# Patient Record
Sex: MALE | Race: WHITE | NOT HISPANIC OR LATINO | Employment: OTHER | URBAN - METROPOLITAN AREA
[De-identification: names, ages, dates, MRNs, and addresses within clinical notes are randomized per-mention and may not be internally consistent; named-entity substitution may affect disease eponyms.]

---

## 2018-01-20 ENCOUNTER — HOSPITAL ENCOUNTER (INPATIENT)
Facility: CLINIC | Age: 73
LOS: 12 days | Discharge: HOME | DRG: 885 | End: 2018-02-01
Attending: EMERGENCY MEDICINE | Admitting: PSYCHIATRY & NEUROLOGY
Payer: COMMERCIAL

## 2018-01-20 DIAGNOSIS — F31.2 SEVERE MANIC BIPOLAR I DISORDER WITH PSYCHOTIC FEATURES (H): Primary | ICD-10-CM

## 2018-01-20 DIAGNOSIS — F30.10 MANIC BEHAVIOR (H): ICD-10-CM

## 2018-01-20 PROBLEM — F31.61 BIPOLAR 1 DISORDER, MIXED, MILD (H): Status: ACTIVE | Noted: 2018-01-20

## 2018-01-20 LAB
ALBUMIN UR-MCNC: 10 MG/DL
AMPHETAMINES UR QL SCN: NEGATIVE
ANION GAP SERPL CALCULATED.3IONS-SCNC: 6 MMOL/L (ref 3–14)
APPEARANCE UR: CLEAR
BARBITURATES UR QL: NEGATIVE
BASOPHILS # BLD AUTO: 0 10E9/L (ref 0–0.2)
BASOPHILS NFR BLD AUTO: 0.2 %
BENZODIAZ UR QL: NEGATIVE
BILIRUB UR QL STRIP: NEGATIVE
BUN SERPL-MCNC: 19 MG/DL (ref 7–30)
CALCIUM SERPL-MCNC: 8 MG/DL (ref 8.5–10.1)
CANNABINOIDS UR QL SCN: NEGATIVE
CHLORIDE SERPL-SCNC: 108 MMOL/L (ref 94–109)
CO2 SERPL-SCNC: 27 MMOL/L (ref 20–32)
COCAINE UR QL: NEGATIVE
COLOR UR AUTO: YELLOW
CREAT SERPL-MCNC: 1.09 MG/DL (ref 0.66–1.25)
DIFFERENTIAL METHOD BLD: ABNORMAL
EOSINOPHIL # BLD AUTO: 0.2 10E9/L (ref 0–0.7)
EOSINOPHIL NFR BLD AUTO: 2.1 %
ERYTHROCYTE [DISTWIDTH] IN BLOOD BY AUTOMATED COUNT: 15.2 % (ref 10–15)
GFR SERPL CREATININE-BSD FRML MDRD: 67 ML/MIN/1.7M2
GLUCOSE BLDC GLUCOMTR-MCNC: 124 MG/DL (ref 70–99)
GLUCOSE BLDC GLUCOMTR-MCNC: 127 MG/DL (ref 70–99)
GLUCOSE SERPL-MCNC: 144 MG/DL (ref 70–99)
GLUCOSE UR STRIP-MCNC: NEGATIVE MG/DL
HCT VFR BLD AUTO: 43.8 % (ref 40–53)
HGB BLD-MCNC: 14.6 G/DL (ref 13.3–17.7)
HGB UR QL STRIP: NEGATIVE
HYALINE CASTS #/AREA URNS LPF: 1 /LPF (ref 0–2)
IMM GRANULOCYTES # BLD: 0 10E9/L (ref 0–0.4)
IMM GRANULOCYTES NFR BLD: 0.2 %
KETONES UR STRIP-MCNC: NEGATIVE MG/DL
LEUKOCYTE ESTERASE UR QL STRIP: NEGATIVE
LYMPHOCYTES # BLD AUTO: 1.9 10E9/L (ref 0.8–5.3)
LYMPHOCYTES NFR BLD AUTO: 20.8 %
MCH RBC QN AUTO: 27.3 PG (ref 26.5–33)
MCHC RBC AUTO-ENTMCNC: 33.3 G/DL (ref 31.5–36.5)
MCV RBC AUTO: 82 FL (ref 78–100)
MONOCYTES # BLD AUTO: 0.6 10E9/L (ref 0–1.3)
MONOCYTES NFR BLD AUTO: 6.1 %
MUCOUS THREADS #/AREA URNS LPF: PRESENT /LPF
NEUTROPHILS # BLD AUTO: 6.5 10E9/L (ref 1.6–8.3)
NEUTROPHILS NFR BLD AUTO: 70.6 %
NITRATE UR QL: NEGATIVE
NRBC # BLD AUTO: 0 10*3/UL
NRBC BLD AUTO-RTO: 0 /100
OPIATES UR QL SCN: NEGATIVE
PCP UR QL SCN: NEGATIVE
PH UR STRIP: 5.5 PH (ref 5–7)
PLATELET # BLD AUTO: 177 10E9/L (ref 150–450)
POTASSIUM SERPL-SCNC: 4.3 MMOL/L (ref 3.4–5.3)
RBC # BLD AUTO: 5.35 10E12/L (ref 4.4–5.9)
RBC #/AREA URNS AUTO: 1 /HPF (ref 0–2)
SODIUM SERPL-SCNC: 141 MMOL/L (ref 133–144)
SOURCE: ABNORMAL
SP GR UR STRIP: 1.02 (ref 1–1.03)
TSH SERPL DL<=0.005 MIU/L-ACNC: 2.12 MU/L (ref 0.4–4)
UROBILINOGEN UR STRIP-MCNC: NORMAL MG/DL (ref 0–2)
WBC # BLD AUTO: 9.1 10E9/L (ref 4–11)
WBC #/AREA URNS AUTO: 1 /HPF (ref 0–2)

## 2018-01-20 PROCEDURE — 25000132 ZZH RX MED GY IP 250 OP 250 PS 637: Performed by: EMERGENCY MEDICINE

## 2018-01-20 PROCEDURE — 99285 EMERGENCY DEPT VISIT HI MDM: CPT | Mod: 25

## 2018-01-20 PROCEDURE — 80048 BASIC METABOLIC PNL TOTAL CA: CPT | Performed by: EMERGENCY MEDICINE

## 2018-01-20 PROCEDURE — 90791 PSYCH DIAGNOSTIC EVALUATION: CPT

## 2018-01-20 PROCEDURE — 87086 URINE CULTURE/COLONY COUNT: CPT | Performed by: EMERGENCY MEDICINE

## 2018-01-20 PROCEDURE — 00000146 ZZHCL STATISTIC GLUCOSE BY METER IP

## 2018-01-20 PROCEDURE — A9270 NON-COVERED ITEM OR SERVICE: HCPCS | Mod: GY | Performed by: PSYCHIATRY & NEUROLOGY

## 2018-01-20 PROCEDURE — 85025 COMPLETE CBC W/AUTO DIFF WBC: CPT | Performed by: EMERGENCY MEDICINE

## 2018-01-20 PROCEDURE — 80307 DRUG TEST PRSMV CHEM ANLYZR: CPT | Performed by: EMERGENCY MEDICINE

## 2018-01-20 PROCEDURE — 84443 ASSAY THYROID STIM HORMONE: CPT | Performed by: EMERGENCY MEDICINE

## 2018-01-20 PROCEDURE — 83036 HEMOGLOBIN GLYCOSYLATED A1C: CPT | Performed by: EMERGENCY MEDICINE

## 2018-01-20 PROCEDURE — 81001 URINALYSIS AUTO W/SCOPE: CPT | Performed by: EMERGENCY MEDICINE

## 2018-01-20 PROCEDURE — 12400006 ZZH R&B MH INTERMEDIATE

## 2018-01-20 PROCEDURE — 25000132 ZZH RX MED GY IP 250 OP 250 PS 637: Mod: GY | Performed by: PSYCHIATRY & NEUROLOGY

## 2018-01-20 RX ORDER — OLANZAPINE 5 MG/1
5-10 TABLET ORAL EVERY 6 HOURS PRN
Status: DISCONTINUED | OUTPATIENT
Start: 2018-01-20 | End: 2018-02-01 | Stop reason: HOSPADM

## 2018-01-20 RX ORDER — MULTIVITAMIN,THERAPEUTIC
1 TABLET ORAL DAILY
COMMUNITY

## 2018-01-20 RX ORDER — LISINOPRIL 5 MG/1
5 TABLET ORAL AT BEDTIME
Status: DISCONTINUED | OUTPATIENT
Start: 2018-01-20 | End: 2018-02-01 | Stop reason: HOSPADM

## 2018-01-20 RX ORDER — MULTIVITAMIN,THERAPEUTIC
1 TABLET ORAL DAILY
Status: DISCONTINUED | OUTPATIENT
Start: 2018-01-21 | End: 2018-02-01 | Stop reason: HOSPADM

## 2018-01-20 RX ORDER — DONEPEZIL HYDROCHLORIDE 5 MG/1
5 TABLET, FILM COATED ORAL AT BEDTIME
Status: DISCONTINUED | OUTPATIENT
Start: 2018-01-20 | End: 2018-01-23

## 2018-01-20 RX ORDER — METFORMIN HCL 500 MG
500 TABLET, EXTENDED RELEASE 24 HR ORAL
COMMUNITY

## 2018-01-20 RX ORDER — QUETIAPINE FUMARATE 25 MG/1
25-50 TABLET, FILM COATED ORAL 2 TIMES DAILY PRN
Status: DISCONTINUED | OUTPATIENT
Start: 2018-01-20 | End: 2018-02-01 | Stop reason: HOSPADM

## 2018-01-20 RX ORDER — METFORMIN HCL 500 MG
500 TABLET, EXTENDED RELEASE 24 HR ORAL
Status: DISCONTINUED | OUTPATIENT
Start: 2018-01-21 | End: 2018-02-01 | Stop reason: HOSPADM

## 2018-01-20 RX ORDER — LAMOTRIGINE 25 MG/1
25 TABLET ORAL DAILY
Status: DISCONTINUED | OUTPATIENT
Start: 2018-01-21 | End: 2018-01-30

## 2018-01-20 RX ORDER — TAMSULOSIN HYDROCHLORIDE 0.4 MG/1
0.4 CAPSULE ORAL AT BEDTIME
Status: DISCONTINUED | OUTPATIENT
Start: 2018-01-20 | End: 2018-02-01 | Stop reason: HOSPADM

## 2018-01-20 RX ORDER — OLANZAPINE 10 MG/1
10 TABLET, ORALLY DISINTEGRATING ORAL ONCE
Status: COMPLETED | OUTPATIENT
Start: 2018-01-20 | End: 2018-01-20

## 2018-01-20 RX ORDER — OLANZAPINE 10 MG/1
10 TABLET, ORALLY DISINTEGRATING ORAL AT BEDTIME
Status: DISCONTINUED | OUTPATIENT
Start: 2018-01-20 | End: 2018-01-20

## 2018-01-20 RX ORDER — TAMSULOSIN HYDROCHLORIDE 0.4 MG/1
0.4 CAPSULE ORAL AT BEDTIME
COMMUNITY

## 2018-01-20 RX ORDER — OMEGA-3-ACID ETHYL ESTERS 1 G/1
1.4 CAPSULE, LIQUID FILLED ORAL DAILY
COMMUNITY

## 2018-01-20 RX ORDER — QUETIAPINE FUMARATE 25 MG/1
25 TABLET, FILM COATED ORAL 2 TIMES DAILY
Status: DISCONTINUED | OUTPATIENT
Start: 2018-01-21 | End: 2018-02-01 | Stop reason: HOSPADM

## 2018-01-20 RX ORDER — HYDROXYZINE HYDROCHLORIDE 25 MG/1
25-50 TABLET, FILM COATED ORAL EVERY 4 HOURS PRN
Status: DISCONTINUED | OUTPATIENT
Start: 2018-01-20 | End: 2018-02-01 | Stop reason: HOSPADM

## 2018-01-20 RX ORDER — ASPIRIN 81 MG/1
81 TABLET, CHEWABLE ORAL AT BEDTIME
Status: DISCONTINUED | OUTPATIENT
Start: 2018-01-20 | End: 2018-02-01 | Stop reason: HOSPADM

## 2018-01-20 RX ORDER — ATORVASTATIN CALCIUM 10 MG/1
20 TABLET, FILM COATED ORAL AT BEDTIME
Status: DISCONTINUED | OUTPATIENT
Start: 2018-01-20 | End: 2018-02-01 | Stop reason: HOSPADM

## 2018-01-20 RX ORDER — OMEGA-3-ACID ETHYL ESTERS 1 G/1
1 CAPSULE, LIQUID FILLED ORAL DAILY
Status: DISCONTINUED | OUTPATIENT
Start: 2018-01-21 | End: 2018-02-01 | Stop reason: HOSPADM

## 2018-01-20 RX ADMIN — OLANZAPINE 10 MG: 10 TABLET, ORALLY DISINTEGRATING ORAL at 17:56

## 2018-01-20 RX ADMIN — LISINOPRIL 5 MG: 5 TABLET ORAL at 22:49

## 2018-01-20 RX ADMIN — ASPIRIN 81 MG 81 MG: 81 TABLET ORAL at 22:49

## 2018-01-20 RX ADMIN — TAMSULOSIN HYDROCHLORIDE 0.4 MG: 0.4 CAPSULE ORAL at 22:49

## 2018-01-20 RX ADMIN — ATORVASTATIN CALCIUM 20 MG: 10 TABLET, FILM COATED ORAL at 22:49

## 2018-01-20 RX ADMIN — CHOLECALCIFEROL TAB 25 MCG (1000 UNIT) 1000 UNITS: 25 TAB at 22:49

## 2018-01-20 ASSESSMENT — ACTIVITIES OF DAILY LIVING (ADL)
BATHING: 0 - INDEPENDENT
FALL_HISTORY_WITHIN_LAST_SIX_MONTHS: YES
RETIRED_COMMUNICATION: 0-->UNDERSTANDS/COMMUNICATES WITHOUT DIFFICULTY
SWALLOWING: 0 - SWALLOWS FOODS/LIQUIDS WITHOUT DIFFICULTY
DRESS: 0-->INDEPENDENT
CHANGE_IN_FUNCTIONAL_STATUS_SINCE_ONSET_OF_CURRENT_ILLNESS/INJURY: NO
TOILETING: 0 - INDEPENDENT
COGNITION: 0 - NO COGNITION ISSUES REPORTED
NUMBER_OF_TIMES_PATIENT_HAS_FALLEN_WITHIN_LAST_SIX_MONTHS: 1
LAUNDRY: WITH SUPERVISION
TRANSFERRING: 0-->INDEPENDENT
COMMUNICATION: 0 - UNDERSTANDS/COMMUNICATES WITHOUT DIFFICULTY
EATING: 0 - INDEPENDENT
AMBULATION: 0-->INDEPENDENT
ORAL_HYGIENE: INDEPENDENT
TRANSFERRING: 0 - INDEPENDENT
BATHING: 0-->INDEPENDENT
GROOMING: INDEPENDENT
SWALLOWING: 0-->SWALLOWS FOODS/LIQUIDS WITHOUT DIFFICULTY
RETIRED_EATING: 0-->INDEPENDENT
TOILETING: 0-->INDEPENDENT
AMBULATION: 0 - INDEPENDENT
DRESS: INDEPENDENT
DRESS: 0 - INDEPENDENT

## 2018-01-20 ASSESSMENT — ENCOUNTER SYMPTOMS
HALLUCINATIONS: 0
HYPERACTIVE: 1
NERVOUS/ANXIOUS: 1

## 2018-01-20 NOTE — IP AVS SNAPSHOT
[place] [address] [place] [address]    Phone:  [phone]                                       After Visit Summary   1/20/2018    Konstantin Morris    MRN: [id number]           After Visit Summary Signature Page     I have received my discharge instructions, and my questions have been answered. I have discussed any challenges I see with this plan with the nurse or doctor.    ..........................................................................................................................................  Patient/Patient Representative Signature      ..........................................................................................................................................  Patient Representative Print Name and Relationship to Patient    ..................................................               ................................................  Date                                            Time    ..........................................................................................................................................  Reviewed by Signature/Title    ...................................................              ..............................................  Date                                                            Time

## 2018-01-20 NOTE — IP AVS SNAPSHOT
MRN:[id number]                      After Visit Summary   1/20/2018    Konstantin Morris    MRN: [id number]           Thank you!     Thank you for choosing [facility] for your care. Our goal is always to provide you with excellent care.        Patient Information     Date Of Birth          1945        Designated Caregiver       Most Recent Value    Caregiver    Will someone help with your care after discharge? yes    Name of designated caregiver Hodan    Phone number of caregiver [phone]    Caregiver address [place], [place]      About your hospital stay     You were admitted on:  January 20, 2018 You last received care in the:  [facility]    You were discharged on:  February 1, 2018       Who to Call     For medical emergencies, please call 911.  For non-urgent questions about your medical care, please call your primary care provider or clinic, [phone]          Attending Provider     Provider Specialty    Mackenzie Rudd MD Emergency Medicine    [facility], Ricardo Vyas MD Psychiatry    Jairo Mayo MD Psychiatry       Primary Care Provider Office Phone # Fax #    [facility] [phone]      Further instructions from your care team       Behavioral Discharge Planning and Instructions    Summary: Admitted with I[facility]    Main Diagnosis: Bipolar Disorder Type I, most recent episode manic with psychotic features.    Lifestyle Adjustment: Please follow with psychiatrist and intensive outpatient program    Psychiatry Follow-up:     [place] [id number]  [place]  [phone]  Fax [phone]  Intake appointment for Intensive Outpatient [facility] with _Chriss Boo on Tuesday 2/6/18 @ 8:30 paperwork and interview @ 9 am._    [facility]  [address] [facility] [address]  [place]  [phone]  Fax [phone]  Appointment with Dr. Jairo Mayo MD on  "Friday, February 9 at 3:15 PM    Resources:   Crisis Intervention: 663.380.1375 or 394-254-7206 (TTY: 463.790.2709).  Call anytime for help.  National [place] on Mental Illness (www.[place].terra.org): 370.314.3360 or 221-936-5800.  Suicide Awareness Voices of Education (SAVE) (www.save.org): 823-935-ROKY (7072)  National Suicide Prevention Line (www.mentalhealthmn.org): 971-666-WMKE (5464)  Mental Health Consumer/Survivor Network of [place] (www.mhcsn.net): 209.130.7890 or 199-627-0500  Mental Health Association of [place] (www.mentalhealth.org): 973.400.8416 or 310-792-8117    General Medication Instructions:   See your medication sheet(s) for instructions.   Take all medicines as directed.  Make no changes unless your doctor suggests them.   Go to all your doctor visits.  Be sure to have all your required lab tests. This way, your medicines can be refilled on time.  Do not use any drugs not prescribed by your doctor.  Avoid alcohol.      Pending Results     No orders found from 1/18/2018 to 1/21/2018.            Statement of Approval     Ordered          02/01/18 4667  I have reviewed and agree with all the recommendations and orders detailed in this document.  EFFECTIVE NOW     Approved and electronically signed by:  Jairo Mayo MD             Admission Information     Date & Time Department Dept. Phone    1/20/2018 [facility] [phone]      Your Vitals Were     Blood Pressure Pulse Temperature Respirations Height Weight    122/78 56 98.4  F (36.9  C) (Oral) 15 1.753 m (5' 9\") 118.8 kg (261 lb 14.4 oz)    Pulse Oximetry BMI (Body Mass Index)                97% 38.68 kg/m2          [employer] Information     [employer] lets you send messages to your doctor, view your test results, renew your prescriptions, schedule appointments and more. To sign up, go to [email or web address] . Click on \"Log in\" on the left side of the screen, which will take you to the Welcome page. Then click on \"Sign up Now\" " on the right side of the page.     You will be asked to enter the access code listed below, as well as some personal information. Please follow the directions to create your username and password.     Your access code is: [email or web address]  Expires: 2018  1:15 PM     Your access code will  in 90 days. If you need help or a new code, please call your [facility] clinic or [phone].        Care EveryWhere ID     This is your Care EveryWhere ID. This could be used by other organizations to access your [facility] medical records  [id number]        Equal Access to Services     [facility]: Hadchayo Elizabeth, meka morris, pushpa quiroz, ruth mayorga . So [facility] [phone].    ATENCIÓN: Si habla español, tiene a pérez disposición servicios gratuitos de asistencia lingüística. Llame al [phone].    We comply with applicable federal civil rights laws and [place] laws. We do not discriminate on the basis of race, color, national origin, age, disability, sex, sexual orientation, or gender identity.               Review of your medicines      START taking        Dose / Directions    divalproex sodium extended-release 500 MG 24 hr tablet   Commonly known as:  DEPAKOTE ER   Used for:  Severe manic bipolar I disorder with psychotic features (H)        Dose:  1000 mg   Take 2 tablets (1,000 mg) by mouth 2 times daily   Quantity:  120 tablet   Refills:  0         CONTINUE these medicines which may have CHANGED, or have new prescriptions. If we are uncertain of the size of tablets/capsules you have at home, strength may be listed as something that might have changed.        Dose / Directions    * QUEtiapine 100 MG tablet   Commonly known as:  SEROquel   This may have changed:    - medication strength  - how much to take  - Another medication with the same name was removed. Continue taking this medication, and follow the directions you see here.   Used for:  Severe  manic bipolar I disorder with psychotic features (H)        Dose:  100 mg   Take 1 tablet (100 mg) by mouth At Bedtime   Quantity:  30 tablet   Refills:  0       * QUEtiapine 25 MG tablet   Commonly known as:  SEROquel   This may have changed:    - Another medication with the same name was changed. Make sure you understand how and when to take each.  - Another medication with the same name was removed. Continue taking this medication, and follow the directions you see here.   Used for:  Severe manic bipolar I disorder with psychotic features (H)        Dose:  25 mg   Take 1 tablet (25 mg) by mouth 2 times daily   Quantity:  60 tablet   Refills:  0       * Notice:  This list has 2 medication(s) that are the same as other medications prescribed for you. Read the directions carefully, and ask your doctor or other care provider to review them with you.      CONTINUE these medicines which have NOT CHANGED        Dose / Directions    ASPIRIN PO        Dose:  81 mg   Take 81 mg by mouth At Bedtime   Refills:  0       FLOMAX 0.4 MG capsule   Generic drug:  tamsulosin        Dose:  0.4 mg   Take 0.4 mg by mouth At Bedtime   Refills:  0       LIPITOR PO        Dose:  20 mg   Take 20 mg by mouth At Bedtime   Refills:  0       LISINOPRIL PO        Dose:  5 mg   Take 5 mg by mouth At Bedtime   Refills:  0       MAGNESIUM OXIDE PO        Dose:  250 mg   Take 250 mg by mouth daily   Refills:  0       metFORMIN 500 MG 24 hr tablet   Commonly known as:  GLUCOPHAGE-XR        Dose:  500 mg   Take 500 mg by mouth daily (with dinner)   Refills:  0       multivitamin, therapeutic Tabs tablet        Dose:  1 tablet   Take 1 tablet by mouth daily   Refills:  0       omega-3 acid ethyl esters 1 G capsule   Commonly known as:  Lovaza        Dose:  1.4 g   Take 1.4 g by mouth daily   Refills:  0       VITAMIN D (CHOLECALCIFEROL) PO        Dose:  1000 Units   Take 1,000 Units by mouth At Bedtime   Refills:  0         STOP taking     DONEPEZIL  HCL PO           LAMICTAL PO           ZOLOFT PO                Where to get your medicines      These medications were sent to [facility] [place], [place] - [address]  [address] [place] [address]     Phone:  [phone]     divalproex sodium extended-release 500 MG 24 hr tablet    QUEtiapine 100 MG tablet    QUEtiapine 25 MG tablet                Protect others around you: Learn how to safely use, store and throw away your medicines at www.disposemymeds.org.             Medication List: This is a list of all your medications and when to take them. Check marks below indicate your daily home schedule. Keep this list as a reference.      Medications           Morning Afternoon Evening Bedtime As Needed    ASPIRIN PO   Take 81 mg by mouth At Bedtime   Last time this was given:  81 mg on 1/31/2018  9:55 PM                                   divalproex sodium extended-release 500 MG 24 hr tablet   Commonly known as:  DEPAKOTE ER   Take 2 tablets (1,000 mg) by mouth 2 times daily   Last time this was given:  1,000 mg on 2/1/2018  8:13 AM                                      FLOMAX 0.4 MG capsule   Take 0.4 mg by mouth At Bedtime   Last time this was given:  0.4 mg on 1/31/2018  9:55 PM   Generic drug:  tamsulosin                                   LIPITOR PO   Take 20 mg by mouth At Bedtime   Last time this was given:  20 mg on 1/31/2018  9:55 PM                                   LISINOPRIL PO   Take 5 mg by mouth At Bedtime   Last time this was given:  5 mg on 1/31/2018  9:55 PM                                   MAGNESIUM OXIDE PO   Take 250 mg by mouth daily   Last time this was given:  200 mg on 2/1/2018  8:13 AM                                   metFORMIN 500 MG 24 hr tablet   Commonly known as:  GLUCOPHAGE-XR   Take 500 mg by mouth daily (with dinner)   Last time this was given:  500 mg on 1/31/2018  5:39 PM                    With Dinner.               multivitamin, therapeutic Tabs  tablet   Take 1 tablet by mouth daily   Last time this was given:  1 tablet on 2/1/2018  8:13 AM                                   omega-3 acid ethyl esters 1 G capsule   Commonly known as:  Lovaza   Take 1.4 g by mouth daily   Last time this was given:  1 g on 2/1/2018  8:13 AM                                   * QUEtiapine 100 MG tablet   Commonly known as:  SEROquel   Take 1 tablet (100 mg) by mouth At Bedtime   Last time this was given:  25 mg on 2/1/2018  1:58 PM                                   * QUEtiapine 25 MG tablet   Commonly known as:  SEROquel   Take 1 tablet (25 mg) by mouth 2 times daily   Last time this was given:  25 mg on 2/1/2018  1:58 PM            8 AM       2 PM                   VITAMIN D (CHOLECALCIFEROL) PO   Take 1,000 Units by mouth At Bedtime   Last time this was given:  1,000 Units on 1/31/2018  9:55 PM                                   * Notice:  This list has 2 medication(s) that are the same as other medications prescribed for you. Read the directions carefully, and ask your doctor or other care provider to review them with you.

## 2018-01-20 NOTE — ED NOTES
"Pt states that he had an episode of babbling around 10am but then drank a coke and has felt better ever since.  Pt states that he does in fact feel back to \"baseline\" but wife is shaking her head no in the background.   "

## 2018-01-20 NOTE — ED NOTES
Bed: ED16  Expected date: 1/20/18  Expected time:   Means of arrival:   Comments:  Hold for psych eval triage

## 2018-01-20 NOTE — ED PROVIDER NOTES
History     Chief Complaint:  Psychiatric Evaluation       HPI   Konstantin Morris is a 72 year old male who presents to the emergency department today for a psychiatric evaluation. The patient reports that he is in the emergency department for treatment of hypomania with associated difficulty sleeping and anxiety. He reports that he was put on Aricept by his primary care provider on 12/29 for memory issues. He was seen by his psychiatrist, Sonia Anaya, at [facility] on 1/11 at which time she diagnosed him bipolar disorder and placed him on Lamictal for hypomania. He is also followed by Dr. Guerra at [facility]. He reports that he had a concerning episode this morning that prompted his visit to the emergency department. He was previously hospitalized in 2013 for severe psychotic depression and has previously attempted to commit suicide 3 times. He reports that he has been having vivid dreams, which are unusual for him but denies any auditory or visual hallucinations while he is awake. The patient denies any suicidal or homicidal ideations.     Allergies:  No Known Drug Allergies     Medications:    Aricept  Lamictal     Past Medical History:    Diabetes Mellitus  Hypertension  Neuropathy  Obstructive sleep apnea   Bipolar disorder  Depressive disorder    Past Surgical History:    History reviewed. No pertinent surgical history.     Family History:    Bipolar disorder    Social History:  The patient was accompanied to the ED by his wife.  Smoking Status: Never Smoker  Smokeless Tobacco: Never Used  Alcohol Use: No   Marital Status:       Review of Systems   Psychiatric/Behavioral: Negative for hallucinations, self-injury and suicidal ideas. The patient is nervous/anxious and is hyperactive.    All other systems reviewed and are negative.    Physical Exam   Patient Vitals for the past 24 hrs:   BP Temp Temp src Pulse Resp SpO2 Height Weight   01/20/18 1744 - - - - - 97 % - -   01/20/18  "1736 - - - - - 97 % - -   01/20/18 1735 - - - - - 97 % - -   01/20/18 1725 154/81 - - - - 96 % - -   01/20/18 1316 120/69 98.2  F (36.8  C) Temporal 90 20 95 % 1.753 m (5' 9\") 119.7 kg (264 lb)      Physical Exam  General: Well-nourished, appears to be resting comfortably when I enter the room  Eyes: PERRL, conjunctivae pink no scleral icterus or conjunctival injection  ENT:  Moist mucus membranes, posterior oropharynx clear without erythema or exudates  Respiratory:  Lungs clear to auscultation bilaterally, no crackles/rubs/wheezes.  Good air movement  CV: Normal rate and rhythm, no murmurs/rubs/gallops  GI:  Abdomen soft and non-distended.  Normoactive BS.  No tenderness, guarding or rebound  Skin: Warm, dry.  No rashes or petechiae  Musculoskeletal: No peripheral edema or calf tenderness  Neuro: Alert and oriented to person/place/time  Psychiatric: Anxious affect, pressured and wandering speech.  Irritable with wife during interview.  No apparent hallucinations.  Denies SI/HI.    Emergency Department Course     Laboratory:  Laboratory findings were communicated with the patient who voiced understanding of the findings.  UA with Microscopic: clear yellow urine, protein albumin 10, mucous present o/w WNL  Drug abuse screen 77 urine: Negative    Urine Culture Aerobic Bacterial: Pending     CBC: AWNL. (WBC 9.1, HGB 14.6, )   BMP: Glucose 144 (H), Calcium 8.0 (L) o/w WNL (Creatinine 1.09)  Glucose by meter: 124 (H)    Interventions:  1756: Zyprexa 10mg ODT     Emergency Department Course:  Nursing notes and vitals reviewed.  The patient provided a urine sample here in the emergency department. This was sent for laboratory testing, findings above.   IV was inserted and blood was drawn for laboratory testing, results above.   1425: I performed an exam of the patient as documented above.   1436: I spoke with DEC regarding patient's presentation, findings, and plan of care.   1714: Patient had a panic attack and " required Zyprexa. Discussed with family who wanted admission.  1820: Patient rechecked and updated.    Findings and plan explained to the patient and family who consent to admission. The patient will be admitted to an [facility] bed for further monitoring, evaluation, and treatment.   I personally reviewed the laboratory results with the Patient and his wife and answered all related questions prior to admit.     Impression & Plan      Medical Decision Making:  Konstantin Morris is a pleasant 72 year old gentleman who has a history of psychosis from depression, recently diagnosed with bipolar disorder and had some medication changes who appears to be in manic a phase right now and significantly decompensated. He had a screaming panic attack here, which required us to physically hold him to prevent him from hurting himself until he was able to calm down. His blood sugar was normal and he was not seizing.  He apparently he was seeing spiders and became very anxious. He accepted readily some Zyprexa, which was given to him. DEC assessment had already been completed. In discussion with family, we think he does need admission. He calmed considerably and family was able to meet with him.  He wishes to be admitted to get help.  He will be admitted to [facility] psychiatry for further treatment.    Diagnosis:    ICD-10-CM    1. Manic behavior (H) F30.10      Disposition:   Admitted to [facility] bed  Scribe Disclosure:  I, Ani Dallas, am serving as a scribe at 2:35 PM on 1/20/2018 to document services personally performed by Mackenzie Rudd MD based on my observations and the provider's statements to me.    1/20/2018   EMERGENCY DEPARTMENT      Mackenzie Rudd MD  01/20/18 1950

## 2018-01-21 LAB
BACTERIA SPEC CULT: NO GROWTH
HBA1C MFR BLD: 6 % (ref 4.3–6)
Lab: NORMAL
SPECIMEN SOURCE: NORMAL

## 2018-01-21 PROCEDURE — 12400006 ZZH R&B MH INTERMEDIATE

## 2018-01-21 PROCEDURE — 25000132 ZZH RX MED GY IP 250 OP 250 PS 637: Mod: GY | Performed by: PSYCHIATRY & NEUROLOGY

## 2018-01-21 PROCEDURE — 94660 CPAP INITIATION&MGMT: CPT

## 2018-01-21 PROCEDURE — 99207 ZZC DOWN CODE DUE TO INITIAL EXAM: CPT | Performed by: INTERNAL MEDICINE

## 2018-01-21 PROCEDURE — 40000275 ZZH STATISTIC RCP TIME EA 10 MIN

## 2018-01-21 PROCEDURE — 99221 1ST HOSP IP/OBS SF/LOW 40: CPT | Performed by: INTERNAL MEDICINE

## 2018-01-21 PROCEDURE — 90853 GROUP PSYCHOTHERAPY: CPT

## 2018-01-21 PROCEDURE — A9270 NON-COVERED ITEM OR SERVICE: HCPCS | Mod: GY | Performed by: PSYCHIATRY & NEUROLOGY

## 2018-01-21 RX ORDER — DIVALPROEX SODIUM 500 MG/1
500 TABLET, EXTENDED RELEASE ORAL 2 TIMES DAILY
Status: DISCONTINUED | OUTPATIENT
Start: 2018-01-21 | End: 2018-01-23

## 2018-01-21 RX ORDER — POLYETHYLENE GLYCOL 3350 17 G/17G
17 POWDER, FOR SOLUTION ORAL DAILY
Status: DISCONTINUED | OUTPATIENT
Start: 2018-01-22 | End: 2018-02-01 | Stop reason: HOSPADM

## 2018-01-21 RX ADMIN — OMEGA-3-ACID ETHYL ESTERS 1 G: 1 CAPSULE, LIQUID FILLED ORAL at 09:40

## 2018-01-21 RX ADMIN — ASPIRIN 81 MG 81 MG: 81 TABLET ORAL at 21:35

## 2018-01-21 RX ADMIN — LAMOTRIGINE 25 MG: 25 TABLET ORAL at 09:40

## 2018-01-21 RX ADMIN — Medication 37.5 MG: at 00:08

## 2018-01-21 RX ADMIN — DONEPEZIL HYDROCHLORIDE 5 MG: 5 TABLET, FILM COATED ORAL at 00:09

## 2018-01-21 RX ADMIN — DONEPEZIL HYDROCHLORIDE 5 MG: 5 TABLET, FILM COATED ORAL at 21:35

## 2018-01-21 RX ADMIN — DIVALPROEX SODIUM 500 MG: 500 TABLET, EXTENDED RELEASE ORAL at 11:58

## 2018-01-21 RX ADMIN — ATORVASTATIN CALCIUM 20 MG: 10 TABLET, FILM COATED ORAL at 21:35

## 2018-01-21 RX ADMIN — QUETIAPINE 25 MG: 25 TABLET ORAL at 09:41

## 2018-01-21 RX ADMIN — QUETIAPINE 25 MG: 25 TABLET ORAL at 14:10

## 2018-01-21 RX ADMIN — SERTRALINE HYDROCHLORIDE 50 MG: 50 TABLET ORAL at 09:40

## 2018-01-21 RX ADMIN — LISINOPRIL 5 MG: 5 TABLET ORAL at 21:35

## 2018-01-21 RX ADMIN — METFORMIN HYDROCHLORIDE 500 MG: 500 TABLET, EXTENDED RELEASE ORAL at 18:16

## 2018-01-21 RX ADMIN — OLANZAPINE 10 MG: 5 TABLET, FILM COATED ORAL at 11:14

## 2018-01-21 RX ADMIN — Medication 200 MG: at 11:35

## 2018-01-21 RX ADMIN — TAMSULOSIN HYDROCHLORIDE 0.4 MG: 0.4 CAPSULE ORAL at 21:35

## 2018-01-21 RX ADMIN — DIVALPROEX SODIUM 500 MG: 500 TABLET, EXTENDED RELEASE ORAL at 21:35

## 2018-01-21 RX ADMIN — THERA TABS 1 TABLET: TAB at 09:40

## 2018-01-21 RX ADMIN — Medication 37.5 MG: at 21:35

## 2018-01-21 ASSESSMENT — ACTIVITIES OF DAILY LIVING (ADL)
DRESS: SCRUBS (BEHAVIORAL HEALTH)
ORAL_HYGIENE: INDEPENDENT
LAUNDRY: WITH SUPERVISION
GROOMING: INDEPENDENT
GROOMING: INDEPENDENT
DRESS: SCRUBS (BEHAVIORAL HEALTH)
ORAL_HYGIENE: INDEPENDENT
LAUNDRY: WITH SUPERVISION

## 2018-01-21 NOTE — PROGRESS NOTES
Writer called Respiratory regarding Pt's request for a C-Pap machine but the RT technician that Writer talked with named Billy stated that the hospital is out of C-Pap machines at the moment. Writer also called the nursing Supervisor, Lucía to inform her of such information. The latter promised to check on the request and get back to Writer. Eventually, a RT technician came up at about 0030 following which the patient who had told writer that he wanted to go to [facility] and had taken his HS medications was woken up for C-Pap set up. Pt is cooperative and denies any pain or discomfort at this time.

## 2018-01-21 NOTE — PLAN OF CARE
"Problem: Manic Symptoms  Goal: Manic Symptoms  Signs and symptoms of listed problems will be absent or manageable.   Outcome: No Change  Pt was sleeping at the start of the shift, he was calm and very social with staff this morning.  He was eating well and talking it up with his peers.  Late morning after using the bathroom he was seen walking back to his room slowly, staff approached and he had his eyes only half open.  As he got into bed he was not taking with staff.  The house keeper was changing the paper bags when Konstantin suddenly almost jumped up out of bed came out of his room yelling at her and slapped her cart.  Staff redirected him into his room and attempted to talk him down.  He was shaking and he speech was almost child like as he told a story about \"mother cracking eggs at 4:30 in the morning...\" he was repetitive and started to look increasingly on edge and agitated.  Staff offered to get oral PRN meds he was agreeable.  As staff was pulling the meds he was yelling out on the unit \"NO mom, NO NO, don't\"  When staff entered the room he was laying on his bed with eyes closed and would not respond to staff, he woke to some nailbed pressure and was yelling \"don't hurt me again\" with encouragement he took the offered PRN Zyprexa.  15 min later he exited the room calmly and was appologizing for his behaviors.  The rest of the day he has been calm and very social with his peers.  Family visited late in the shift and brought his cpap and some snacks.      "

## 2018-01-21 NOTE — PHARMACY-ADMISSION MEDICATION HISTORY
Admission medication history interview status for the 1/20/2018  admission is complete. See EPIC admission navigator for prior to admission medications     Medication history source reliability:Moderate    Actions taken by pharmacist (provider contacted, etc):None     Additional medication history information not noted on PTA med list :The patient has poor memory, not sure when he took the last dose     Medication reconciliation/reorder completed by provider prior to medication history? No    Time spent in this activity: 15 minutes    Prior to Admission medications    Medication Sig Last Dose Taking? Auth Provider   LamoTRIgine (LAMICTAL PO) Take 25 mg by mouth daily Added this med on 1/11/18, take one-half tablet once daily for one week, take one tablet once daily for one week, then titrate up to 100mg  Yes Unknown, Entered By History   QUEtiapine Fumarate (SEROQUEL PO) Take 12.5 mg by mouth 2 times daily   Yes Unknown, Entered By History   Sertraline HCl (ZOLOFT PO) Take 50 mg by mouth daily  Yes Unknown, Entered By History   QUEtiapine Fumarate (SEROQUEL PO) Take 37.5 mg by mouth At Bedtime  Yes Unknown, Entered By History

## 2018-01-21 NOTE — PROGRESS NOTES
RT got called from RN regarding pt requesting CPAP. RT explained that the hospital is short of CPAP/BiPAP machine and currently I can only place if the pt is in respiratory distress. RT went to mental health department and explained again the situation of CPAP/BiPAP shortage. I also mentioned that we only have two CPAP/BiPAP in the entire hospital for emergency situation. Later another RT got called from nursing supervisor and was told to put pt on CPAP. RT used the back up CPAP and placed the pt. Pt placed on CPAP +8 21% at 0110, when RT went to check for 0300 round, pt took off the CPAP and turned off the CPAP machine. RT will continue to follow.  1/21/2018  Anny Panchal

## 2018-01-21 NOTE — ED NOTES
Pt requesting we call his wife Hodan [phone] to bring his CPAP machine. Called with no answer. [address] called and spoke with Dorene-notified that pt will need a CPAP machine to sleep.

## 2018-01-21 NOTE — PROGRESS NOTES
01/20/18 2698   Patient Belongings   Did you bring any home meds/supplements to the hospital?  No   Disposition of Belongings Other (see comment)   Belongings Search Yes   Clothing Search Yes   Second Staff Ondina Pates  Shirt  n[employer] films jacket  3 gloves  Belt  3 pens  Wallet   - $23 cash   - misc. Business cards   - misc. Rewards cards  keychain w/ 9 keys  Notepad  Cell phone  Shoes (no laces)  [place]    Security Envelope #[id number]   - Visa [phone]   - SS card    Admission:  I am responsible for any personal items that are not sent to the safe or pharmacy. [facility] is not responsible for loss, theft or damage of any property in my possession.        Patient Signature: ___________________________________________      Date/Time:__________________________     Staff Signature: __________________________________      Date/Time:__________________________     [facility] Staff person, if patient is unable/unwilling to sign:      __________________________________________________________      Date/Time: __________________________        Discharge:  [facility] has returned all of my personal belongings:     Patient Signature: ________________________________________     Date/Time: ____________________________________     Staff Signature: ______________________________________     Date/Time:_____________________________________

## 2018-01-21 NOTE — H&P
[facility] Psychiatric H&P Note       Initial History     The patient's care was discussed with the treatment team and chart notes were reviewed.     Patient examined for psychiatric admission.     IDENTIFICATION    Patient is a 72 year old   male. Pt sees PCP [facility]. [facility], [place]. Pt sees a therapist at [facility]. He sees Sonia Anaya CNP for medication management.    HISTORY OF PRESENT ILLNESS    Mr. Konstantin Morris is a 72 year old male with a history of bipolar disorder and cognitive disorder. He states he has never exhibited manic symptoms prior to this recent episode. He states that he saw his medication provider recently and was given a diagnosis of bipolar disorder. He is a poor historian. 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 He claims he has won edjing for the work he had done for television. He reports he has interviewed a savant in the past. He states that he has worked as a  as well. He is quite grandiose in his presentation. He reports that he has met many famous people throughout his life. Pt has had a manic episode with elevated and expansive mood, a surplus of energy, grandiosity, decreased need for sleep, pressured speech, flight of ideas, increase in goal-oriented activity, increase in psychomotor agitation all of which were to the point of psychosocial impairment. Pt endorses that episode was not secondary to chemical use and lasted at least one week in time.  When informed that MD had to leave to see other patients, he urged MD to stay and listen to history. He continued to ramble and eventually interview had to be terminated. He then shut down and became withdrawn.    CHEMICAL DEPENDENCY HISTORY    None reported. Utox negative on admission.    PAST  PSYCHIATRIC HISTORY    One prior hospitalization in  the past for psychotic depression. Three prior suicide attempts.    FAMILY HISTORY    Sister and mother have a history of bipolar disordesr and hospitalization. Sister's son is an alcoholic, another son passed away from an overdose. His father used alcohol.    SOCIAL HISTORY    Pt was born and raised in [place], [place] as the youngest of two children. He graduated high school and attended school at [employer] in [place]. His father was alcoholic and verbally abusive. 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 He has six regional [employer] in recognition for his work. He has three children with his wife.     Medications     Prescriptions Prior to Admission   Medication Sig Dispense Refill Last Dose     LamoTRIgine (LAMICTAL PO) Take 25 mg by mouth daily Added this med on 1/11/18. Take 25mg for 2 weeks. Then take 50mg for 2 weeks. Then check in with NP, Sonia Anaya at [facility] in [place].   1/20/2018 at Unknown time     QUEtiapine Fumarate (SEROQUEL PO) Take 25 mg by mouth 2 times daily    1/20/2018 at Unknown time     Sertraline HCl (ZOLOFT PO) Take 50 mg by mouth daily   1/20/2018 at Unknown time     QUEtiapine Fumarate (SEROQUEL PO) Take 37.5 mg by mouth At Bedtime   1/19/2018 at Unknown time     QUEtiapine Fumarate (SEROQUEL PO) Take 25-50 mg by mouth 2 times daily as needed   1/20/2018 at Unknown time     ASPIRIN PO Take 81 mg by mouth At Bedtime   1/19/2018 at Unknown time     omega-3 acid ethyl esters (LOVAZA) 1 G capsule Take 1.4 g by mouth daily   1/20/2018 at Unknown time     multivitamin, therapeutic (THERA-VIT) TABS tablet Take 1 tablet by mouth daily   1/20/2018 at Unknown time     MAGNESIUM OXIDE PO Take 250 mg by mouth daily   1/20/2018 at Unknown time     VITAMIN D, CHOLECALCIFEROL, PO Take 1,000 Units by mouth At Bedtime   1/19/2018 at Unknown time     Atorvastatin Calcium (LIPITOR PO) Take 20 mg by mouth At Bedtime   1/19/2018 at Unknown  "time     tamsulosin (FLOMAX) 0.4 MG capsule Take 0.4 mg by mouth At Bedtime   1/19/2018 at Unknown time     LISINOPRIL PO Take 5 mg by mouth At Bedtime   1/19/2018 at Unknown time     DONEPEZIL HCL PO Take 5 mg by mouth At Bedtime   1/19/2018 at Unknown time     metFORMIN (GLUCOPHAGE-XR) 500 MG 24 hr tablet Take 500 mg by mouth daily (with dinner)   1/20/2018 at Unknown time       Scheduled Medications:    aspirin chewable tablet 81 mg  81 mg Oral At Bedtime     atorvastatin (LIPITOR) tablet 20 mg  20 mg Oral At Bedtime     donepezil (ARIcept) tablet 5 mg  5 mg Oral At Bedtime     lamoTRIgine (LaMICtal) tablet 25 mg  25 mg Oral Daily     lisinopril (PRINIVIL/ZESTRIL) tablet 5 mg  5 mg Oral At Bedtime     magnesium oxide  200 mg Oral Daily     metFORMIN  500 mg Oral Daily with supper     multivitamin, therapeutic  1 tablet Oral Daily     omega-3 acid ethyl esters  1 g Oral Daily     QUEtiapine (SEROquel) tablet 25 mg  25 mg Oral BID     QUEtiapine (SEROquel) half-tab 37.5 mg  37.5 mg Oral At Bedtime     sertraline (ZOLOFT) tablet 50 mg  50 mg Oral Daily     tamsulosin  0.4 mg Oral At Bedtime     cholecalciferol  1,000 Units Oral At Bedtime     PRNs:  hydrOXYzine, QUEtiapine (SEROquel) tablet 25-50 mg, OLANZapine      Allergies      No Known Allergies     Previous Medical History     Past Medical History:   Diagnosis Date     Depressive disorder      Diabetes mellitus (H)      Hypertension      Neuropathy      ROBIN (obstructive sleep apnea)         Medical Review of Systems     /88  Pulse 69  Temp 97.5  F (36.4  C) (Oral)  Resp 15  Ht 1.753 m (5' 9\")  Wt 117.5 kg (259 lb)  SpO2 97%  BMI 38.25 kg/m2  Body mass index is 38.25 kg/(m^2).    Previous 10-point ROS completed by Mackenzie Rudd MD on 1/20/18 reviewed by Jairo Mayo MD on January 21, 2018 and is unchanged except for those problems mentioned within the HPI.     Mental Status Examination     Appearance Sitting in chair, dressed in scrubs. Appears " stated age.   Attitude Cooperative   Orientation Oriented to person, place, time   Eye Contact Poor   Speech Hyperverbal, tangential   Language Normal   Psychomotor Behavior Agitated   Mood Euphoric, labile   Affect Heightened, dramatic   Thought Process Disorganized   Associations Intact   Thought Content Denies suicidal or homicidal ideation.    Fund of Knowledge Average   Insight Poor   Judgement Poor   Attention Span & Concentration Limited   Recent & Remote Memory Limited   Gait Normal   Muscle Tone Intact on visual inspection      Labs     Labs reviewed.  Recent Results (from the past 24 hour(s))   Glucose by meter    Collection Time: 01/20/18  2:08 PM   Result Value Ref Range    Glucose 124 (H) 70 - 99 mg/dL   Urine Culture    Collection Time: 01/20/18  2:44 PM   Result Value Ref Range    Specimen Description Midstream Urine     Special Requests Specimen received in preservative     Culture Micro PENDING    UA with Microscopic    Collection Time: 01/20/18  2:44 PM   Result Value Ref Range    Color Urine Yellow     Appearance Urine Clear     Glucose Urine Negative NEG^Negative mg/dL    Bilirubin Urine Negative NEG^Negative    Ketones Urine Negative NEG^Negative mg/dL    Specific Gravity Urine 1.024 1.003 - 1.035    Blood Urine Negative NEG^Negative    pH Urine 5.5 5.0 - 7.0 pH    Protein Albumin Urine 10 (A) NEG^Negative mg/dL    Urobilinogen mg/dL Normal 0.0 - 2.0 mg/dL    Nitrite Urine Negative NEG^Negative    Leukocyte Esterase Urine Negative NEG^Negative    Source Midstream Urine     WBC Urine 1 0 - 2 /HPF    RBC Urine 1 0 - 2 /HPF    Mucous Urine Present (A) NEG^Negative /LPF    Hyaline Casts 1 0 - 2 /LPF   Drug abuse screen urine    Collection Time: 01/20/18  2:44 PM   Result Value Ref Range    Amphetamine Qual Urine Negative NEG^Negative    Barbiturates Qual Urine Negative NEG^Negative    Benzodiazepine Qual Urine Negative NEG^Negative    Cannabinoids Qual Urine Negative NEG^Negative    Cocaine Qual  Urine Negative NEG^Negative    Opiates Qualitative Urine Negative NEG^Negative    PCP Qual Urine Negative NEG^Negative   CBC with platelets differential    Collection Time: 01/20/18  3:00 PM   Result Value Ref Range    WBC 9.1 4.0 - 11.0 10e9/L    RBC Count 5.35 4.4 - 5.9 10e12/L    Hemoglobin 14.6 13.3 - 17.7 g/dL    Hematocrit 43.8 40.0 - 53.0 %    MCV 82 78 - 100 fl    MCH 27.3 26.5 - 33.0 pg    MCHC 33.3 31.5 - 36.5 g/dL    RDW 15.2 (H) 10.0 - 15.0 %    Platelet Count 177 150 - 450 10e9/L    Diff Method Automated Method     % Neutrophils 70.6 %    % Lymphocytes 20.8 %    % Monocytes 6.1 %    % Eosinophils 2.1 %    % Basophils 0.2 %    % Immature Granulocytes 0.2 %    Nucleated RBCs 0 0 /100    Absolute Neutrophil 6.5 1.6 - 8.3 10e9/L    Absolute Lymphocytes 1.9 0.8 - 5.3 10e9/L    Absolute Monocytes 0.6 0.0 - 1.3 10e9/L    Absolute Eosinophils 0.2 0.0 - 0.7 10e9/L    Absolute Basophils 0.0 0.0 - 0.2 10e9/L    Abs Immature Granulocytes 0.0 0 - 0.4 10e9/L    Absolute Nucleated RBC 0.0    Basic metabolic panel    Collection Time: 01/20/18  3:00 PM   Result Value Ref Range    Sodium 141 133 - 144 mmol/L    Potassium 4.3 3.4 - 5.3 mmol/L    Chloride 108 94 - 109 mmol/L    Carbon Dioxide 27 20 - 32 mmol/L    Anion Gap 6 3 - 14 mmol/L    Glucose 144 (H) 70 - 99 mg/dL    Urea Nitrogen 19 7 - 30 mg/dL    Creatinine 1.09 0.66 - 1.25 mg/dL    GFR Estimate 67 >60 mL/min/1.7m2    GFR Estimate If Black 81 >60 mL/min/1.7m2    Calcium 8.0 (L) 8.5 - 10.1 mg/dL   TSH with free T4 reflex    Collection Time: 01/20/18  3:00 PM   Result Value Ref Range    TSH 2.12 0.40 - 4.00 mU/L   Glucose by meter    Collection Time: 01/20/18  5:17 PM   Result Value Ref Range    Glucose 127 (H) 70 - 99 mg/dL          Impression     This is a 72 year old male with a history of bipolar disorder, depression, and cognitive disorder who presents with manic behavior and agitation Discussed starting pt on Depakote.  Reviewed the side effects, benefits,  and complications of medication. Pt gave verbal agreement to begin Depakote 500mg bid. He will remain on the unit for stabilization.     Diagnoses     1. Bipolar Disorder Type I, most recent episode manic with psychotic features.     Plan     1. Explained side effects, benefits, and complications of medications to the patient, Pt gave verbal consent.  2. Medication changes: Begin Depakote 500mg bid.  3. Discussed treatment plan with patient and team.  4. Projected length of stay: Until pt has been stabilized with aftercare in place, 5+ days.  5. Dr. Wallace to assume care tomorrow.    Attestation:   Patient has been seen and evaluated by me, Jairo Mayo MD.    Patient ID:  Name: Konstantin Morris   MRN: [id number]  Admission: 1/20/2018   YOB: 1945

## 2018-01-21 NOTE — PROGRESS NOTES
Voluntary Pt arrived to [facility] ED with Hx bipolar & 1 previous hospitalization after a SA in 2013 by hanging. 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 Had another panic attack in ED today. Zoloft recently cut in half from 100mg to 50mg. Pt claims he has been sad for the past 3 years because co-workers have not contacted him to see how he is doing. Felt this reached a boiling point today. Non-insulin dependent diabetic, takes metformin. Does not check blood sugars. Uses a c-pap at night. No CD Hx. Father was an alcoholic & mother had bipolar illness and received ECT. 1 sister has bipolar & 1 sister has depression. Wife is supportive. Pt not currently suicidal and contracts for safety. New ID band placed on Pt. Denies allergies to medications.    Nursing assessment complete including patient and medication profiles. Risk assessments completed addressing suicide,fall,skin,nutrition and safety issues. Care plan initiated. Assessments reviewed with physician and admit orders received.     Welcome packet reviewed with patient. Information reviewed includes getting emergency help, preventing infections, understanding your care, using medication safely, reducing falls, preventing pressure ulcers, smoking cessation, powerful choices and Patients Bill of Rights. Pt. given tour of the unit and instruction on use of facility including emergency call light. Program schedule reviewed with patient. Questions regarding the unit addressed. Pt. Search completed and belongings inventoried.

## 2018-01-22 LAB — VALPROATE SERPL-MCNC: 26 MG/L (ref 50–100)

## 2018-01-22 PROCEDURE — 80164 ASSAY DIPROPYLACETIC ACD TOT: CPT | Performed by: PSYCHIATRY & NEUROLOGY

## 2018-01-22 PROCEDURE — 36415 COLL VENOUS BLD VENIPUNCTURE: CPT | Performed by: PSYCHIATRY & NEUROLOGY

## 2018-01-22 PROCEDURE — 25000132 ZZH RX MED GY IP 250 OP 250 PS 637: Mod: GY | Performed by: PSYCHIATRY & NEUROLOGY

## 2018-01-22 PROCEDURE — 12400000 ZZH R&B MH

## 2018-01-22 PROCEDURE — 99231 SBSQ HOSP IP/OBS SF/LOW 25: CPT | Performed by: INTERNAL MEDICINE

## 2018-01-22 PROCEDURE — A9270 NON-COVERED ITEM OR SERVICE: HCPCS | Mod: GY | Performed by: PSYCHIATRY & NEUROLOGY

## 2018-01-22 RX ADMIN — CHOLECALCIFEROL TAB 25 MCG (1000 UNIT) 1000 UNITS: 25 TAB at 21:41

## 2018-01-22 RX ADMIN — DONEPEZIL HYDROCHLORIDE 5 MG: 5 TABLET, FILM COATED ORAL at 21:40

## 2018-01-22 RX ADMIN — ATORVASTATIN CALCIUM 20 MG: 10 TABLET, FILM COATED ORAL at 21:41

## 2018-01-22 RX ADMIN — DIVALPROEX SODIUM 500 MG: 500 TABLET, EXTENDED RELEASE ORAL at 21:41

## 2018-01-22 RX ADMIN — QUETIAPINE 25 MG: 25 TABLET ORAL at 16:08

## 2018-01-22 RX ADMIN — OLANZAPINE 10 MG: 5 TABLET, FILM COATED ORAL at 08:49

## 2018-01-22 RX ADMIN — LISINOPRIL 5 MG: 5 TABLET ORAL at 21:41

## 2018-01-22 RX ADMIN — OMEGA-3-ACID ETHYL ESTERS 1 G: 1 CAPSULE, LIQUID FILLED ORAL at 08:54

## 2018-01-22 RX ADMIN — QUETIAPINE 25 MG: 25 TABLET ORAL at 08:54

## 2018-01-22 RX ADMIN — THERA TABS 1 TABLET: TAB at 08:54

## 2018-01-22 RX ADMIN — TAMSULOSIN HYDROCHLORIDE 0.4 MG: 0.4 CAPSULE ORAL at 21:41

## 2018-01-22 RX ADMIN — LAMOTRIGINE 25 MG: 25 TABLET ORAL at 08:54

## 2018-01-22 RX ADMIN — METFORMIN HYDROCHLORIDE 500 MG: 500 TABLET, EXTENDED RELEASE ORAL at 17:32

## 2018-01-22 RX ADMIN — ASPIRIN 81 MG 81 MG: 81 TABLET ORAL at 21:40

## 2018-01-22 RX ADMIN — Medication 37.5 MG: at 23:27

## 2018-01-22 RX ADMIN — DIVALPROEX SODIUM 500 MG: 500 TABLET, EXTENDED RELEASE ORAL at 08:54

## 2018-01-22 RX ADMIN — Medication 200 MG: at 08:54

## 2018-01-22 ASSESSMENT — ACTIVITIES OF DAILY LIVING (ADL)
DRESS: INDEPENDENT
DRESS: PROMPTS
GROOMING: INDEPENDENT
LAUNDRY: WITH SUPERVISION
ORAL_HYGIENE: INDEPENDENT
GROOMING: INDEPENDENT
LAUNDRY: WITH SUPERVISION
ORAL_HYGIENE: INDEPENDENT

## 2018-01-22 NOTE — PLAN OF CARE
Problem: Patient Care Overview  Goal: Individualization & Mutuality  Outcome: Improving  Grandiose at times. He had an episode this morning during breakfast, while he was picking up his breakfast, while he was shaking and nervous, he was helped to the chair and he continued shaking, did not appear to be a seizure, no muscle stiffness, rigidity or loss of consciousness.  He had his eyes closed but he was able to respond to us.  Gave him Olanzapine and all his medication and he did better. Patient is on depakote.   Needs redirection at times.

## 2018-01-22 NOTE — H&P
History and Physical     Konstantin Morris MRN# [id number]   YOB: 1945 Age: 72 year old      Date of Admission:  1/20/2018    Primary care provider: [facility], [place]          Assessment and Plan:     72 year old male, with PmHx of bipolar disease, Type 2 DM, ?diabetic neuropathy, depression,  previous suicide attempts, sleep apnea, was admitted on 1/20/18, due to concern of tequila.  Patient had several panic attacks on the day of admission. Work up done on 1/20/18 revealed,  Normal BMP and CBC. UA and Urine drug screen were negative.     1. Bipolar 1 disorder: for management per Psychiatrist. Continue depakote, lamotrigine and seroquel.    2. Type 2 Diabetes: HbA1C was 6% on 1/20/18. Continue metformin. Continue aspirin. Will not order insulin sliding scale prn or accuchecks, due to good glycemic control.    3. Dementia: continue Aricept.    4. BPH: continue flomax.    5. Hypertension: continue lisinopril.    6. Hyperlipidemia: continue lipitor.    Thank you for asking the hospitalist service to see the patient. We will see the patient  one more time and if patient is medical stable, we will sign off then.                   Chief Complaint:   For Initial Psychiatry H+P.     History is obtained from the patient         History of Present Illness:   72 year old male, with PmHx of bipolar disease, Type 2 DM, ?diabetic neuropathy, depression,  previous suicide attempts, sleep apnea, was admitted due to concern of tequila.  Patient had several panic attacks on the day of admission.  He reported having bad dreams recently and waking up  panicking.  He was at a [employer] function on the day of his admission,   when he had a breakdown post presentation. He started yelling. No complaints of chest pain or shortness of breath or palpitations. He reported having a cardiac stress test 2-3 years ago,   which was normal.He reported having occasional diarrhea and nocturia 5-6 times a night. Work up done on 1/20/18  revealed, normal BMP and CBC. UA and Urine drug screen were negative.                Past Medical History:     Past Medical History:   Diagnosis Date     Depressive disorder      Diabetes mellitus (H)      Hypertension      Neuropathy      ROBIN (obstructive sleep apnea)              Past Surgical History:   History reviewed. No pertinent surgical history.          Social History:     Social History   Substance Use Topics     Smoking status: Never Smoker     Smokeless tobacco: Never Used     Alcohol use No             Family History:   No family history on file.          Immunizations:     There is no immunization history on file for this patient.         Allergies:   No Known Allergies          Medications:     Prescriptions Prior to Admission   Medication Sig Dispense Refill Last Dose     LamoTRIgine (LAMICTAL PO) Take 25 mg by mouth daily Added this med on 1/11/18. Take 25mg for 2 weeks. Then take 50mg for 2 weeks. Then check in with NP, Sonia Anaya at [facility] in [place].   1/20/2018 at Unknown time     QUEtiapine Fumarate (SEROQUEL PO) Take 25 mg by mouth 2 times daily    1/20/2018 at Unknown time     Sertraline HCl (ZOLOFT PO) Take 50 mg by mouth daily   1/20/2018 at Unknown time     QUEtiapine Fumarate (SEROQUEL PO) Take 37.5 mg by mouth At Bedtime   1/19/2018 at Unknown time     QUEtiapine Fumarate (SEROQUEL PO) Take 25-50 mg by mouth 2 times daily as needed   1/20/2018 at Unknown time     ASPIRIN PO Take 81 mg by mouth At Bedtime   1/19/2018 at Unknown time     omega-3 acid ethyl esters (LOVAZA) 1 G capsule Take 1.4 g by mouth daily   1/20/2018 at Unknown time     multivitamin, therapeutic (THERA-VIT) TABS tablet Take 1 tablet by mouth daily   1/20/2018 at Unknown time     MAGNESIUM OXIDE PO Take 250 mg by mouth daily   1/20/2018 at Unknown time     VITAMIN D, CHOLECALCIFEROL, PO Take 1,000 Units by mouth At Bedtime   1/19/2018 at Unknown time     Atorvastatin Calcium (LIPITOR PO) Take 20 mg by  "mouth At Bedtime   1/19/2018 at Unknown time     tamsulosin (FLOMAX) 0.4 MG capsule Take 0.4 mg by mouth At Bedtime   1/19/2018 at Unknown time     LISINOPRIL PO Take 5 mg by mouth At Bedtime   1/19/2018 at Unknown time     DONEPEZIL HCL PO Take 5 mg by mouth At Bedtime   1/19/2018 at Unknown time     metFORMIN (GLUCOPHAGE-XR) 500 MG 24 hr tablet Take 500 mg by mouth daily (with dinner)   1/20/2018 at Unknown time             Review of Systems:   The 10 point Review of Systems is negative other than noted in the HPI           Physical Exam:     Vitals were reviewed  Patient Vitals for the past 24 hrs:   BP Temp Temp src Pulse Resp Height Weight   01/21/18 1614 100/47 98.2  F (36.8  C) Oral 82 16 - -   01/21/18 0900 137/88 97.5  F (36.4  C) Oral 69 15 - -   01/20/18 2127 138/86 97.6  F (36.4  C) Oral 67 16 1.753 m (5' 9\") 117.5 kg (259 lb)     Constitutional:   Elderly white male, awake, cooperative, no apparent distress, and appears stated age     Lungs:   Clear to auscultation bilaterally, no crackles or wheezing     Heart:   S1, S2 reg, rate and rhythm, no murmurs         Abdomen:   Obese,  non-tender, no palpable masses, BS present                  Data:     Results for orders placed or performed during the hospital encounter of 01/20/18   Glucose by meter   Result Value Ref Range    Glucose 124 (H) 70 - 99 mg/dL   UA with Microscopic   Result Value Ref Range    Color Urine Yellow     Appearance Urine Clear     Glucose Urine Negative NEG^Negative mg/dL    Bilirubin Urine Negative NEG^Negative    Ketones Urine Negative NEG^Negative mg/dL    Specific Gravity Urine 1.024 1.003 - 1.035    Blood Urine Negative NEG^Negative    pH Urine 5.5 5.0 - 7.0 pH    Protein Albumin Urine 10 (A) NEG^Negative mg/dL    Urobilinogen mg/dL Normal 0.0 - 2.0 mg/dL    Nitrite Urine Negative NEG^Negative    Leukocyte Esterase Urine Negative NEG^Negative    Source Midstream Urine     WBC Urine 1 0 - 2 /HPF    RBC Urine 1 0 - 2 /HPF    " Mucous Urine Present (A) NEG^Negative /LPF    Hyaline Casts 1 0 - 2 /LPF   Drug abuse screen urine   Result Value Ref Range    Amphetamine Qual Urine Negative NEG^Negative    Barbiturates Qual Urine Negative NEG^Negative    Benzodiazepine Qual Urine Negative NEG^Negative    Cannabinoids Qual Urine Negative NEG^Negative    Cocaine Qual Urine Negative NEG^Negative    Opiates Qualitative Urine Negative NEG^Negative    PCP Qual Urine Negative NEG^Negative   CBC with platelets differential   Result Value Ref Range    WBC 9.1 4.0 - 11.0 10e9/L    RBC Count 5.35 4.4 - 5.9 10e12/L    Hemoglobin 14.6 13.3 - 17.7 g/dL    Hematocrit 43.8 40.0 - 53.0 %    MCV 82 78 - 100 fl    MCH 27.3 26.5 - 33.0 pg    MCHC 33.3 31.5 - 36.5 g/dL    RDW 15.2 (H) 10.0 - 15.0 %    Platelet Count 177 150 - 450 10e9/L    Diff Method Automated Method     % Neutrophils 70.6 %    % Lymphocytes 20.8 %    % Monocytes 6.1 %    % Eosinophils 2.1 %    % Basophils 0.2 %    % Immature Granulocytes 0.2 %    Nucleated RBCs 0 0 /100    Absolute Neutrophil 6.5 1.6 - 8.3 10e9/L    Absolute Lymphocytes 1.9 0.8 - 5.3 10e9/L    Absolute Monocytes 0.6 0.0 - 1.3 10e9/L    Absolute Eosinophils 0.2 0.0 - 0.7 10e9/L    Absolute Basophils 0.0 0.0 - 0.2 10e9/L    Abs Immature Granulocytes 0.0 0 - 0.4 10e9/L    Absolute Nucleated RBC 0.0    Basic metabolic panel   Result Value Ref Range    Sodium 141 133 - 144 mmol/L    Potassium 4.3 3.4 - 5.3 mmol/L    Chloride 108 94 - 109 mmol/L    Carbon Dioxide 27 20 - 32 mmol/L    Anion Gap 6 3 - 14 mmol/L    Glucose 144 (H) 70 - 99 mg/dL    Urea Nitrogen 19 7 - 30 mg/dL    Creatinine 1.09 0.66 - 1.25 mg/dL    GFR Estimate 67 >60 mL/min/1.7m2    GFR Estimate If Black 81 >60 mL/min/1.7m2    Calcium 8.0 (L) 8.5 - 10.1 mg/dL   Glucose by meter   Result Value Ref Range    Glucose 127 (H) 70 - 99 mg/dL   TSH with free T4 reflex   Result Value Ref Range    TSH 2.12 0.40 - 4.00 mU/L   Hemoglobin A1c   Result Value Ref Range    Hemoglobin A1C  6.0 4.3 - 6.0 %   Valproic acid   Result Value Ref Range    Valproic Acid Level 26 (L) 50 - 100 mg/L   Urine Culture   Result Value Ref Range    Specimen Description Midstream Urine     Special Requests Specimen received in preservative     Culture Micro No growth

## 2018-01-22 NOTE — PLAN OF CARE
Problem: Patient Care Overview  Goal: Team Discussion  Team Plan:   Outcome: Improving  BEHAVIORAL TEAM DISCUSSION    Participants: Dr. Mayo, Case Management, Nursing staff, Psych associates   Progress: Patient continues to present as labile and grandiose at times.   Continued Stay Criteria/Rationale: Patient needs further stabilization.  Medical/Physical: n/a   Precautions:   Behavioral Orders   Procedures     Code 1 - Restrict to Unit     Routine Programming     As clinically indicated     Status 15     Every 15 minutes.     Plan: Patient needs further stabilization before discharge planning can be assessed.  Rationale for change in precautions or plan: patient improving slightly.

## 2018-01-22 NOTE — PLAN OF CARE
Problem: Manic Symptoms  Goal: Manic Symptoms  Signs and symptoms of listed problems will be absent or manageable.   Outcome: Improving  Visible,bright affect ,  appropiate and socially outgoing with others but not intrusive .Mood more stable.  Was pleasant, respectful with no emotional outbursts or irritabilty. Wife and two daughters visited and thing went well. He watched football on TV and was friendly. Was hyperverbal and speech was organized , logical. 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  Had some chafing of skin between legs . Provided baby powder and he says it helped.

## 2018-01-22 NOTE — PROGRESS NOTES
[facility] Psychiatric Progress Note       Interim History     The patient's care was discussed with the treatment team and chart notes were reviewed. Pt seen on ITC. Spoke with pt's wife yesterday. She reports that he has a history of psychotic depression and hospitalization in the past. He had an adverse reaction to antidepressants and was taken off of the medication due to increasing manic behavior. Pt had an episode of shaking while he was eating breakfast, claiming that it was a seizure. However he did not experience any loss of consciousness or muscle rigidity. He was able to speak with staff with his eyes closed. He was assisted with staff to return to his chair and continue eating breakfast. He continues to exhibit manic behavior but is redirectable.      Hospital Course     On 1/21/18, pt was admitted for manic behavior and agitation. He was started on Depakote 500mg bid. On 1/22/18, pt continued to exhibit tequila and lability. He will be continued on Depakote and provided Zyprexa and Seroquel PRN.     Medications     Current Facility-Administered Medications Ordered in Epic   Medication Dose Route Frequency Last Rate Last Dose     divalproex (DEPAKOTE ER) 24 hr tablet 500 mg  500 mg Oral BID   500 mg at 01/22/18 0854     polyethylene glycol (MIRALAX/GLYCOLAX) Packet 17 g  17 g Oral Daily         hydrOXYzine (ATARAX) tablet 25-50 mg  25-50 mg Oral Q4H PRN         aspirin chewable tablet 81 mg  81 mg Oral At Bedtime   81 mg at 01/21/18 2135     atorvastatin (LIPITOR) tablet 20 mg  20 mg Oral At Bedtime   20 mg at 01/21/18 2135     donepezil (ARIcept) tablet 5 mg  5 mg Oral At Bedtime   5 mg at 01/21/18 2135     lamoTRIgine (LaMICtal) tablet 25 mg  25 mg Oral Daily   25 mg at 01/22/18 0854     lisinopril (PRINIVIL/ZESTRIL) tablet 5 mg  5 mg Oral At Bedtime   5 mg at 01/21/18 2135     magnesium oxide (MAG-OX) half-tab 200 mg  200 mg Oral Daily   200 mg at 01/22/18 0854     metFORMIN  "(GLUCOPHAGE-XR) 24 hr tablet 500 mg  500 mg Oral Daily with supper   500 mg at 01/21/18 1816     multivitamin, therapeutic (THERA-VIT) tablet 1 tablet  1 tablet Oral Daily   1 tablet at 01/22/18 0854     omega-3 acid ethyl esters (Lovaza) capsule 1 g  1 g Oral Daily   1 g at 01/22/18 0854     QUEtiapine (SEROquel) tablet 25 mg  25 mg Oral BID   25 mg at 01/22/18 0854     QUEtiapine (SEROquel) half-tab 37.5 mg  37.5 mg Oral At Bedtime   37.5 mg at 01/21/18 2135     QUEtiapine (SEROquel) tablet 25-50 mg  25-50 mg Oral BID PRN         tamsulosin (FLOMAX) capsule 0.4 mg  0.4 mg Oral At Bedtime   0.4 mg at 01/21/18 2135     cholecalciferol (vitamin D3) tablet 1,000 Units  1,000 Units Oral At Bedtime   1,000 Units at 01/20/18 2249     OLANZapine (zyPREXA) tablet 5-10 mg  5-10 mg Oral Q6H PRN   10 mg at 01/22/18 0849     No current [facility]-ordered outpatient prescriptions on file.         Allergies      No Known Allergies     Medical Review of Systems     /64  Pulse 82  Temp 98.2  F (36.8  C) (Oral)  Resp 16  Ht 1.753 m (5' 9\")  Wt 117.5 kg (259 lb)  SpO2 97%  BMI 38.25 kg/m2  Body mass index is 38.25 kg/(m^2).  A 10-point review of systems was performed by Jairo Mayo MD and is negative, no new findings.      Psychiatric Examination     Appearance Sitting in chair, dressed in scrubs. Appears stated age.   Attitude Agitated   Orientation Oriented to person, place, time   Eye Contact Poor   Speech Hyperverbal, circumstantial   Language Normal   Psychomotor Behavior Agitated   Mood Grandiose, labile   Affect Heightened, dramatic   Thought Process Linear   Associations Intact   Thought Content Denies suicidal or homicidal ideation.    Fund of Knowledge Impaired   Insight Poor   Judgement Poor   Attention Span & Concentration Limited   Recent & Remote Memory Limited   Gait Normal    Muscle Tone Intact on visual inspection.        Labs     Labs reviewed.  Recent Results (from the past 24 hour(s))   Valproic " acid    Collection Time: 01/22/18  8:20 AM   Result Value Ref Range    Valproic Acid Level 26 (L) 50 - 100 mg/L        Impression      This is a 72 year old male with a history of bipolar disorder, depression, and cognitive disorder who presents with manic behavior and agitation. He will remain on the unit for stabilization.      Diagnoses      1. Bipolar Disorder Type I, most recent episode manic with psychotic features.      Plan      1. Explained side effects, benefits, and complications of medications to the patient, Pt gave verbal consent.  2. Medication changes: Continue current medications.  3. Discussed treatment plan with patient and team.  4. Projected length of stay: Until pt has been stabilized with aftercare in place, 5+ days.      Attestation:   Patient has been seen and evaluated by me, Jairo Mayo MD.    Patient ID:  Name: Konstantin Morris    MRN: [id number]  Admission: 1/20/2018   YOB: 1945

## 2018-01-23 LAB — VALPROATE SERPL-MCNC: 63 MG/L (ref 50–100)

## 2018-01-23 PROCEDURE — 80164 ASSAY DIPROPYLACETIC ACD TOT: CPT | Performed by: PSYCHIATRY & NEUROLOGY

## 2018-01-23 PROCEDURE — 97150 GROUP THERAPEUTIC PROCEDURES: CPT | Mod: GO

## 2018-01-23 PROCEDURE — 36415 COLL VENOUS BLD VENIPUNCTURE: CPT | Performed by: PSYCHIATRY & NEUROLOGY

## 2018-01-23 PROCEDURE — 25000132 ZZH RX MED GY IP 250 OP 250 PS 637: Performed by: PSYCHIATRY & NEUROLOGY

## 2018-01-23 PROCEDURE — 12400006 ZZH R&B MH INTERMEDIATE

## 2018-01-23 RX ADMIN — Medication 37.5 MG: at 21:41

## 2018-01-23 RX ADMIN — CHOLECALCIFEROL TAB 25 MCG (1000 UNIT) 1000 UNITS: 25 TAB at 21:42

## 2018-01-23 RX ADMIN — OMEGA-3-ACID ETHYL ESTERS 1 G: 1 CAPSULE, LIQUID FILLED ORAL at 08:56

## 2018-01-23 RX ADMIN — OLANZAPINE 10 MG: 5 TABLET, FILM COATED ORAL at 01:35

## 2018-01-23 RX ADMIN — THERA TABS 1 TABLET: TAB at 08:56

## 2018-01-23 RX ADMIN — METFORMIN HYDROCHLORIDE 500 MG: 500 TABLET, EXTENDED RELEASE ORAL at 17:32

## 2018-01-23 RX ADMIN — QUETIAPINE 25 MG: 25 TABLET ORAL at 14:10

## 2018-01-23 RX ADMIN — TAMSULOSIN HYDROCHLORIDE 0.4 MG: 0.4 CAPSULE ORAL at 21:42

## 2018-01-23 RX ADMIN — ATORVASTATIN CALCIUM 20 MG: 10 TABLET, FILM COATED ORAL at 21:41

## 2018-01-23 RX ADMIN — Medication 200 MG: at 08:56

## 2018-01-23 RX ADMIN — HYDROXYZINE HYDROCHLORIDE 50 MG: 25 TABLET ORAL at 03:59

## 2018-01-23 RX ADMIN — ASPIRIN 81 MG 81 MG: 81 TABLET ORAL at 21:42

## 2018-01-23 RX ADMIN — QUETIAPINE 50 MG: 25 TABLET ORAL at 03:59

## 2018-01-23 RX ADMIN — LISINOPRIL 5 MG: 5 TABLET ORAL at 21:41

## 2018-01-23 RX ADMIN — QUETIAPINE 25 MG: 25 TABLET ORAL at 08:56

## 2018-01-23 RX ADMIN — DIVALPROEX SODIUM 750 MG: 250 TABLET, FILM COATED, EXTENDED RELEASE ORAL at 08:58

## 2018-01-23 RX ADMIN — LAMOTRIGINE 25 MG: 25 TABLET ORAL at 08:56

## 2018-01-23 RX ADMIN — DIVALPROEX SODIUM 750 MG: 250 TABLET, FILM COATED, EXTENDED RELEASE ORAL at 21:42

## 2018-01-23 ASSESSMENT — ACTIVITIES OF DAILY LIVING (ADL)
ORAL_HYGIENE: INDEPENDENT
LAUNDRY: WITH SUPERVISION
GROOMING: INDEPENDENT
DRESS: STREET CLOTHES;INDEPENDENT

## 2018-01-23 NOTE — PLAN OF CARE
Problem: Manic Symptoms  Goal: Manic Symptoms  Signs and symptoms of listed problems will be absent or manageable.   Outcome: Improving  Pt was active and pleasant within the ITC and SDU. Pt presents with a full range affect and calm mood. Pt spent the day socializing with staff and peers in the lounge. Pt attended OT and participated well. Pt stated that he's feeling really good day and hopeful for the future. No complaints of pain today. Pt ate all of his meal and was med compliant.      *Pt's wife would like to talk to doctor about Aricept.*

## 2018-01-23 NOTE — PROGRESS NOTES
[facility] Psychiatric Progress Note       Interim History     The patient's care was discussed with the treatment team and chart notes were reviewed. Pt seen on [facility]. Pt reports he was unable to sleep last night, woke up around 0400. However, he appears less grandiose and manic this morning, less hyperverbal and able to hold a conversation. He is glad to be in the hospital for stabilization. He was apparently started on Aricept for dementia corresponding with the timing of his manic episode. VA level as of yesterday was 26. Will increase Depakote to 750mg bid and valproic acid draw STAT this morning. Aricept was discontinued.     Hospital Course     On 1/21/18, pt was admitted for manic behavior and agitation. He was started on Depakote 500mg bid. On 1/22/18, pt continued to exhibit tequila and lability. He will be continued on Depakote and provided Zyprexa and Seroquel PRN. On 1/23/18, pt was starting to become less manic and grandiose. Will increase Depakote to 750mg bid and valproic acid draw STAT. Aricept discontinued.     Medications     Current Facility-Administered Medications Ordered in Epic   Medication Dose Route Frequency Last Rate Last Dose     divalproex (DEPAKOTE ER) 24 hr tablet 750 mg  750 mg Oral BID         polyethylene glycol (MIRALAX/GLYCOLAX) Packet 17 g  17 g Oral Daily         hydrOXYzine (ATARAX) tablet 25-50 mg  25-50 mg Oral Q4H PRN   50 mg at 01/23/18 0359     aspirin chewable tablet 81 mg  81 mg Oral At Bedtime   81 mg at 01/22/18 2140     atorvastatin (LIPITOR) tablet 20 mg  20 mg Oral At Bedtime   20 mg at 01/22/18 2141     lamoTRIgine (LaMICtal) tablet 25 mg  25 mg Oral Daily   25 mg at 01/22/18 0854     lisinopril (PRINIVIL/ZESTRIL) tablet 5 mg  5 mg Oral At Bedtime   5 mg at 01/22/18 2141     magnesium oxide (MAG-OX) half-tab 200 mg  200 mg Oral Daily   200 mg at 01/22/18 0854     metFORMIN (GLUCOPHAGE-XR) 24 hr tablet 500 mg  500 mg Oral Daily with supper   500 mg at  "01/22/18 1732     multivitamin, therapeutic (THERA-VIT) tablet 1 tablet  1 tablet Oral Daily   1 tablet at 01/22/18 0854     omega-3 acid ethyl esters (Lovaza) capsule 1 g  1 g Oral Daily   1 g at 01/22/18 0854     QUEtiapine (SEROquel) tablet 25 mg  25 mg Oral BID   25 mg at 01/22/18 1608     QUEtiapine (SEROquel) half-tab 37.5 mg  37.5 mg Oral At Bedtime   37.5 mg at 01/22/18 2327     QUEtiapine (SEROquel) tablet 25-50 mg  25-50 mg Oral BID PRN   50 mg at 01/23/18 0359     tamsulosin (FLOMAX) capsule 0.4 mg  0.4 mg Oral At Bedtime   0.4 mg at 01/22/18 2141     cholecalciferol (vitamin D3) tablet 1,000 Units  1,000 Units Oral At Bedtime   1,000 Units at 01/22/18 2141     OLANZapine (zyPREXA) tablet 5-10 mg  5-10 mg Oral Q6H PRN   10 mg at 01/23/18 0135     No current [facility]-ordered outpatient prescriptions on file.         Allergies      No Known Allergies     Medical Review of Systems     /86  Pulse 80  Temp 98.3  F (36.8  C) (Oral)  Resp 17  Ht 1.753 m (5' 9\")  Wt 117.5 kg (259 lb)  SpO2 97%  BMI 38.25 kg/m2  Body mass index is 38.25 kg/(m^2).  A 10-point review of systems was performed by Jairo Mayo MD and is negative, no new findings.      Psychiatric Examination     Appearance Sitting in chair, dressed in scrubs. Appears stated age.   Attitude Agitated   Orientation Oriented to person, place, time   Eye Contact` Fair   Speech Improving, less hyperverbal and circumstantial   Language Normal   Psychomotor Behavior Less agitated   Mood Slightly less grandiose and manic   Affect Heightened, dramatic   Thought Process Linear   Associations Intact   Thought Content Denies suicidal or homicidal ideation.    Fund of Knowledge Improving   Insight Poor   Judgement Poor   Attention Span & Concentration Improving   Recent & Remote Memory Improving   Gait Normal    Muscle Tone Intact on visual inspection.        Labs     Labs reviewed.  Recent Results (from the past 24 hour(s))   Valproic acid    " Collection Time: 01/22/18  8:20 AM   Result Value Ref Range    Valproic Acid Level 26 (L) 50 - 100 mg/L        Impression      This is a 72 year old male with a history of bipolar disorder, depression, and cognitive disorder who presents with manic behavior and agitation. He will remain on the unit for stabilization.      Diagnoses      1. Bipolar Disorder Type I, most recent episode manic with psychotic features.      Plan      1. Explained side effects, benefits, and complications of medications to the patient, Pt gave verbal consent.  2. Medication changes: Discontinue Aricept. Increase Depakote to 750mg bid.  3. STAT VA level.  4. Discussed treatment plan with patient and team.  5. Projected length of stay: Until pt has been stabilized with aftercare in place, 5+ days.      Attestation:   Patient has been seen and evaluated by me, Jairo Mayo MD.    Patient ID:  Name: Konstantin Morris   MRN: [id number]  Admission: 1/20/2018   YOB: 1945

## 2018-01-23 NOTE — PLAN OF CARE
Problem: Manic Symptoms  Goal: Manic Symptoms  Signs and symptoms of listed problems will be absent or manageable.   Outcome: Improving  Patient presents with a more full range affect this shift. Patient was appropriate and insightful. He spent most of the shift bed resting. Patient stated that he didn't fall asleep last night till 4 am and he needed to catch up on some sleep. Patient did not appear labile or grandiose this shift. He was cooperative with medications and pleasant with staff and peers.

## 2018-01-23 NOTE — PLAN OF CARE
Problem: General Rehab Plan of Care  Goal: Occupational Therapy Goals  The patient and/or their representative will achieve their patient-specific goals related to the plan of care.  The patient-specific goals include:  INITIAL O.T. ASSESSMENT   Details:  Pt participated in OT Exercise group today with minimal encouragement. Affect was a bit tense. Pt engaged in group activity intermittently. He was distracted by questions and writing things down frequently. Attention was poor, 5-10 minutes. Pt engaged in about 50% of the group activity as a result. Pt appeared to write down a number of concepts and quotes from this writer, which was odd in the context of an exercise group in which one is expected to be moving, not taking notes. This behavior could be hypergraphia or could be indicative of memory issues secondary to poor attention.

## 2018-01-23 NOTE — PROGRESS NOTES
[facility]    Hospitalist Progress Note  Jake Combs MD    Assessment & Plan     72 year old male, with PmHx of bipolar disease, Type 2 DM, ?diabetic neuropathy, depression,  previous suicide attempts, sleep apnea, was admitted on 1/20/18, due to concern of tequila.  Patient had several panic attacks on the day of admission. Work up done on 1/20/18 revealed,  Normal BMP and CBC. UA and Urine drug screen were negative.      1. Bipolar 1 disorder: for management per Psychiatrist. Continue depakote, lamotrigine and seroquel scheduled and prn. For Zyprexa prn.      2. Type 2 Diabetes: HbA1C was 6% on 1/20/18. Continue metformin. Continue aspirin.   Will not order insulin sliding scale prn or accuchecks, due to good control.     3. Dementia: continue Aricept.     4. BPH: continue flomax.     5. Hypertension: continue lisinopril.     6. Hyperlipidemia: continue lipitor.     Thank you for asking the hospitalist service to see the patient. The patient is currently medically stable. We will sign off today and will be happy to see again, for any acute medical problems.        DVT Prophylaxis: Ambulate every shift  Code Status: Full Code    Disposition: Expected discharge, per Mental Health.      Interval History   The pt has no new complaints today.     -Data reviewed today: I reviewed all new labs and imaging results over the last 24 hours. I personally reviewed no images or EKG's today.    Physical Exam   Temp: 98.3  F (36.8  C) Temp src: Oral BP: 141/86 Pulse: 80   Resp: 17        Vitals:    01/20/18 1316 01/20/18 2127   Weight: 119.7 kg (264 lb) 117.5 kg (259 lb)     Vital Signs with Ranges  Temp:  [98.3  F (36.8  C)] 98.3  F (36.8  C)  Pulse:  [80] 80  Resp:  [17] 17  BP: (112-141)/(64-86) 141/86       Constitutional: Elderly white male, awake, cooperative, no apparent distress  Respiratory: BS vesicular bilaterally, no crackles or wheezing  Cardiovascular: S1 and S2, reg, no murmur  noted  GI: Soft, non-distended, non-tender, no masses, BS present+  Skin/Integumen: No rashes  Extremities: No pedal edema    Medications        divalproex  500 mg Oral BID     polyethylene glycol  17 g Oral Daily     aspirin chewable tablet 81 mg  81 mg Oral At Bedtime     atorvastatin (LIPITOR) tablet 20 mg  20 mg Oral At Bedtime     donepezil (ARIcept) tablet 5 mg  5 mg Oral At Bedtime     lamoTRIgine (LaMICtal) tablet 25 mg  25 mg Oral Daily     lisinopril (PRINIVIL/ZESTRIL) tablet 5 mg  5 mg Oral At Bedtime     magnesium oxide  200 mg Oral Daily     metFORMIN  500 mg Oral Daily with supper     multivitamin, therapeutic  1 tablet Oral Daily     omega-3 acid ethyl esters  1 g Oral Daily     QUEtiapine (SEROquel) tablet 25 mg  25 mg Oral BID     QUEtiapine (SEROquel) half-tab 37.5 mg  37.5 mg Oral At Bedtime     tamsulosin  0.4 mg Oral At Bedtime     cholecalciferol  1,000 Units Oral At Bedtime       Data     Recent Labs  Lab 01/20/18  1500   WBC 9.1   HGB 14.6   MCV 82         POTASSIUM 4.3   CHLORIDE 108   CO2 27   BUN 19   CR 1.09   ANIONGAP 6   MOOSE 8.0*   *       No results found for this or any previous visit (from the past 24 hour(s)).

## 2018-01-24 PROCEDURE — 97150 GROUP THERAPEUTIC PROCEDURES: CPT | Mod: GO

## 2018-01-24 PROCEDURE — 12400006 ZZH R&B MH INTERMEDIATE

## 2018-01-24 PROCEDURE — 25000132 ZZH RX MED GY IP 250 OP 250 PS 637: Performed by: PSYCHIATRY & NEUROLOGY

## 2018-01-24 PROCEDURE — 90853 GROUP PSYCHOTHERAPY: CPT

## 2018-01-24 RX ADMIN — QUETIAPINE 25 MG: 25 TABLET ORAL at 14:32

## 2018-01-24 RX ADMIN — CHOLECALCIFEROL TAB 25 MCG (1000 UNIT) 1000 UNITS: 25 TAB at 21:22

## 2018-01-24 RX ADMIN — LISINOPRIL 5 MG: 5 TABLET ORAL at 21:22

## 2018-01-24 RX ADMIN — DIVALPROEX SODIUM 750 MG: 250 TABLET, FILM COATED, EXTENDED RELEASE ORAL at 08:40

## 2018-01-24 RX ADMIN — QUETIAPINE 25 MG: 25 TABLET ORAL at 08:40

## 2018-01-24 RX ADMIN — DIVALPROEX SODIUM 750 MG: 250 TABLET, FILM COATED, EXTENDED RELEASE ORAL at 20:27

## 2018-01-24 RX ADMIN — METFORMIN HYDROCHLORIDE 500 MG: 500 TABLET, EXTENDED RELEASE ORAL at 17:41

## 2018-01-24 RX ADMIN — OLANZAPINE 10 MG: 5 TABLET, FILM COATED ORAL at 23:24

## 2018-01-24 RX ADMIN — TAMSULOSIN HYDROCHLORIDE 0.4 MG: 0.4 CAPSULE ORAL at 21:22

## 2018-01-24 RX ADMIN — Medication 200 MG: at 08:40

## 2018-01-24 RX ADMIN — Medication 37.5 MG: at 21:22

## 2018-01-24 RX ADMIN — ATORVASTATIN CALCIUM 20 MG: 10 TABLET, FILM COATED ORAL at 21:22

## 2018-01-24 RX ADMIN — OMEGA-3-ACID ETHYL ESTERS 1 G: 1 CAPSULE, LIQUID FILLED ORAL at 08:42

## 2018-01-24 RX ADMIN — ASPIRIN 81 MG 81 MG: 81 TABLET ORAL at 21:22

## 2018-01-24 RX ADMIN — THERA TABS 1 TABLET: TAB at 08:42

## 2018-01-24 RX ADMIN — LAMOTRIGINE 25 MG: 25 TABLET ORAL at 08:40

## 2018-01-24 RX ADMIN — HYDROXYZINE HYDROCHLORIDE 50 MG: 25 TABLET ORAL at 23:24

## 2018-01-24 NOTE — PLAN OF CARE
Problem: Patient Care Overview  Goal: Discharge Needs Assessment   attempted to meet with patient this afternoon to do a social history. Patient stated to  that he is having issues with memory and requested that  come back to do the social history later when his wife comes to visit.  will check in with him later to see if his wife does come to visit this afternoon.

## 2018-01-24 NOTE — PLAN OF CARE
Problem: Manic Symptoms  Goal: Manic Symptoms  Signs and symptoms of listed problems will be absent or manageable.   Outcome: Improving  Pt has been present and pleasant in the ITC throughout the day shift. Tried to attend a couple groups, but was unable due to personal complications. Pt is respectful to peers and staff and is bright upon approach and communication. Pt ate most of his meals for breakfast and lunch. Pt's gait became unsteady and slow during the morning after eating breakfast and was redirected to room to lay down for a bit. Pt presents a flat affect, but brightens easily. Pt requested headphones, but office did not have any batteries; pt was informed of this and later came up to window to request again.

## 2018-01-24 NOTE — PROGRESS NOTES
[facility] Psychiatric Progress Note       Interim History     The patient's care was discussed with the treatment team and chart notes were reviewed. Pt seen on ITC. Tolerating medications without side effects. Side effects, risks, and benefits of medications reviewed with patient. Pt reports he had some agitation yesterday. He was upset that staff had to utilize an extension cord to use his CPAP. He will be moved to SDU. He reports he is invited to the Super Bowl party with [employer]. He continues to exhibit some grandiosity and euphoria. VA level was 63 as of yesterday. Will check another level after several days on the increased dose of Depakote. Denies suicidal or homicidal ideation.      Hospital Course     On 1/21/18, pt was admitted for manic behavior and agitation. He was started on Depakote 500mg bid. On 1/22/18, pt continued to exhibit tequila and lability. He will be continued on Depakote and provided Zyprexa and Seroquel PRN. On 1/23/18, pt was starting to become less manic and grandiose. Will increase Depakote to 750mg bid and valproic acid draw STAT. Aricept discontinued. On 1/24/18, pt reported that he was frustrated he was unable to utilize his CPAP properly. He will be moved to SDU as he is more appropriate with the medications and able to participate in groups.      Medications     Current Facility-Administered Medications Ordered in Epic   Medication Dose Route Frequency Last Rate Last Dose     divalproex (DEPAKOTE ER) 24 hr tablet 750 mg  750 mg Oral BID   750 mg at 01/24/18 0840     polyethylene glycol (MIRALAX/GLYCOLAX) Packet 17 g  17 g Oral Daily         hydrOXYzine (ATARAX) tablet 25-50 mg  25-50 mg Oral Q4H PRN   50 mg at 01/23/18 0359     aspirin chewable tablet 81 mg  81 mg Oral At Bedtime   81 mg at 01/23/18 2142     atorvastatin (LIPITOR) tablet 20 mg  20 mg Oral At Bedtime   20 mg at 01/23/18 2141     lamoTRIgine (LaMICtal) tablet 25 mg  25 mg Oral Daily   25 mg at  "01/24/18 0840     lisinopril (PRINIVIL/ZESTRIL) tablet 5 mg  5 mg Oral At Bedtime   5 mg at 01/23/18 2141     magnesium oxide (MAG-OX) half-tab 200 mg  200 mg Oral Daily   200 mg at 01/24/18 0840     metFORMIN (GLUCOPHAGE-XR) 24 hr tablet 500 mg  500 mg Oral Daily with supper   500 mg at 01/23/18 1732     multivitamin, therapeutic (THERA-VIT) tablet 1 tablet  1 tablet Oral Daily   1 tablet at 01/24/18 0842     omega-3 acid ethyl esters (Lovaza) capsule 1 g  1 g Oral Daily   1 g at 01/24/18 0842     QUEtiapine (SEROquel) tablet 25 mg  25 mg Oral BID   25 mg at 01/24/18 0840     QUEtiapine (SEROquel) half-tab 37.5 mg  37.5 mg Oral At Bedtime   37.5 mg at 01/23/18 2141     QUEtiapine (SEROquel) tablet 25-50 mg  25-50 mg Oral BID PRN   50 mg at 01/23/18 0359     tamsulosin (FLOMAX) capsule 0.4 mg  0.4 mg Oral At Bedtime   0.4 mg at 01/23/18 2142     cholecalciferol (vitamin D3) tablet 1,000 Units  1,000 Units Oral At Bedtime   1,000 Units at 01/23/18 2142     OLANZapine (zyPREXA) tablet 5-10 mg  5-10 mg Oral Q6H PRN   10 mg at 01/23/18 0135     No current Epic-ordered outpatient prescriptions on file.         Allergies      No Known Allergies     Medical Review of Systems     /80  Pulse 77  Temp 97.4  F (36.3  C) (Oral)  Resp 18  Ht 1.753 m (5' 9\")  Wt 117.5 kg (259 lb)  SpO2 97%  BMI 38.25 kg/m2  Body mass index is 38.25 kg/(m^2).  A 10-point review of systems was performed by Jairo Mayo MD and is negative, no new findings.      Psychiatric Examination     Appearance Sitting in chair, dressed in scrubs. Appears stated age.   Attitude Cooperative, overly familiar   Orientation Oriented to person, place, time   Eye Contact` Fair   Speech Improving, less hyperverbal and circumstantial   Language Normal   Psychomotor Behavior Less agitated   Mood Remains somewhat grandiose and euphoric   Affect Heightened, dramatic   Thought Process Linear   Associations Intact   Thought Content Denies suicidal or " homicidal ideation.    Fund of Knowledge Improving   Insight Poor   Judgement Slightly improved   Attention Span & Concentration Improving   Recent & Remote Memory Improving   Gait Normal    Muscle Tone Intact on visual inspection.        Labs     Labs reviewed.  No results found for this or any previous visit (from the past 24 hour(s)).     Impression      This is a 72 year old male with a history of bipolar disorder, depression, and cognitive disorder who presents with manic behavior and agitation. He will remain on the unit for stabilization.      Diagnoses      1. Bipolar Disorder Type I, most recent episode manic with psychotic features.      Plan      1. Explained side effects, benefits, and complications of medications to the patient, Pt gave verbal consent.  2. Medication changes: Continue current medications.  3. Discussed treatment plan with patient and team.  4. Projected length of stay: Until pt has been stabilized with aftercare in place, 5+ days.  5. Pt to move to SDU.      Attestation:   Patient has been seen and evaluated by me, Jairo Mayo MD.    Patient ID:  Name: Konstantin Morris   MRN: [id number]  Admission: 1/20/2018   YOB: 1945

## 2018-01-24 NOTE — PLAN OF CARE
"Problem: Manic Symptoms  Goal: Manic Symptoms  Signs and symptoms of listed problems will be absent or manageable.   Outcome: No Change  Pt presented with a full-range affect. Was visible in the [facility] lounge. Spent most of the shift watching TV, socializing with peers, and visiting with family. He was pleasant and cooperative for most of the shift, though was labile later in the evening at bed time (see below). Pt hopes to be allowed to attend community meeting tomorrow.      At the end of the shift he requested to use his CPAP. After about an hour of using it, pt was irritable, and demanded that his CPAP be put away as he was concerned that the extension cord was a hazard to other patients. When staff began to gather his CPAP to take it out of his room, he became angry and yelled at the staff member for taking his CPAP away. A moment later he politely thanked staff and offered many compliments, but then became angry again and was threatening to call \"the authorities\" to notify them of all the hazards on the unit.   "

## 2018-01-25 PROCEDURE — 25000132 ZZH RX MED GY IP 250 OP 250 PS 637: Performed by: PSYCHIATRY & NEUROLOGY

## 2018-01-25 PROCEDURE — 25000132 ZZH RX MED GY IP 250 OP 250 PS 637: Performed by: INTERNAL MEDICINE

## 2018-01-25 PROCEDURE — 12400006 ZZH R&B MH INTERMEDIATE

## 2018-01-25 PROCEDURE — 90853 GROUP PSYCHOTHERAPY: CPT

## 2018-01-25 RX ORDER — DIVALPROEX SODIUM 500 MG/1
1000 TABLET, EXTENDED RELEASE ORAL 2 TIMES DAILY
Status: DISCONTINUED | OUTPATIENT
Start: 2018-01-25 | End: 2018-02-01 | Stop reason: HOSPADM

## 2018-01-25 RX ADMIN — TAMSULOSIN HYDROCHLORIDE 0.4 MG: 0.4 CAPSULE ORAL at 22:16

## 2018-01-25 RX ADMIN — Medication 37.5 MG: at 22:16

## 2018-01-25 RX ADMIN — QUETIAPINE 25 MG: 25 TABLET ORAL at 15:00

## 2018-01-25 RX ADMIN — Medication 200 MG: at 08:38

## 2018-01-25 RX ADMIN — THERA TABS 1 TABLET: TAB at 08:38

## 2018-01-25 RX ADMIN — QUETIAPINE 50 MG: 25 TABLET ORAL at 11:09

## 2018-01-25 RX ADMIN — DIVALPROEX SODIUM 750 MG: 250 TABLET, FILM COATED, EXTENDED RELEASE ORAL at 08:38

## 2018-01-25 RX ADMIN — LISINOPRIL 5 MG: 5 TABLET ORAL at 22:16

## 2018-01-25 RX ADMIN — CHOLECALCIFEROL TAB 25 MCG (1000 UNIT) 1000 UNITS: 25 TAB at 22:15

## 2018-01-25 RX ADMIN — OMEGA-3-ACID ETHYL ESTERS 1 G: 1 CAPSULE, LIQUID FILLED ORAL at 08:38

## 2018-01-25 RX ADMIN — POLYETHYLENE GLYCOL 3350 17 G: 17 POWDER, FOR SOLUTION ORAL at 08:35

## 2018-01-25 RX ADMIN — ASPIRIN 81 MG 81 MG: 81 TABLET ORAL at 22:16

## 2018-01-25 RX ADMIN — ATORVASTATIN CALCIUM 20 MG: 10 TABLET, FILM COATED ORAL at 22:16

## 2018-01-25 RX ADMIN — QUETIAPINE 25 MG: 25 TABLET ORAL at 08:38

## 2018-01-25 RX ADMIN — DIVALPROEX SODIUM 1000 MG: 500 TABLET, EXTENDED RELEASE ORAL at 22:15

## 2018-01-25 RX ADMIN — METFORMIN HYDROCHLORIDE 500 MG: 500 TABLET, EXTENDED RELEASE ORAL at 17:42

## 2018-01-25 RX ADMIN — LAMOTRIGINE 25 MG: 25 TABLET ORAL at 08:38

## 2018-01-25 ASSESSMENT — ACTIVITIES OF DAILY LIVING (ADL)
GROOMING: INDEPENDENT
ORAL_HYGIENE: INDEPENDENT
GROOMING: INDEPENDENT
LAUNDRY: WITH SUPERVISION
ORAL_HYGIENE: INDEPENDENT
LAUNDRY: WITH SUPERVISION
DRESS: STREET CLOTHES
DRESS: STREET CLOTHES

## 2018-01-25 NOTE — PLAN OF CARE
Problem: Patient Care Overview  Goal: Discharge Needs Assessment  Physician scheduling family meeting for next Monday,January 29, 2018 at 0900.  attempted to call wife, but there was no answer and the voicemail did not identify the number as belonging to her or her  so no message left.  will attempt to call again later.

## 2018-01-25 NOTE — PLAN OF CARE
Problem: Patient Care Overview  Goal: Discharge Needs Assessment  Patient attended Process Group today. He was noted to be quite tangential in his responses during group discussion. He was mildly intrusive with his peers but was redirectable.

## 2018-01-25 NOTE — PLAN OF CARE
Problem: Manic Symptoms  Goal: Manic Symptoms  Signs and symptoms of listed problems will be absent or manageable.   Outcome: No Change   Pt presents as confused. When he was done with group asked to go back to the [facility] despite having been moved over to the SDU. He required redirection and a tour of the SDU again. Patient displayed poor boundaries when speaking with staff and female peers, grabbed a peer's hand and made flattering comments to staff. Patient did not attend any groups. He visited with his wife and daughter this evening. He has rambling, tangential speech. During 1:1, patient talked about his sleep apnea and how he stopped breathing 46 times and as a result he has short term memory loss. While doing rounds, staff discovered patient had door barricaded with two chairs. When staff asked him about it he stated he was feeling scared that a male was going to enter his room and shoot bullets at him. He states he heard a voice of a male and became scared that he was going to get hurt so he barricaded the door. Patient felt better with reassurance from staff.

## 2018-01-25 NOTE — PROGRESS NOTES
"[facility] Psychiatric Progress Note       Interim History     The patient's care was discussed with the treatment team and chart notes were reviewed. Pt seen on ITC. Tolerating medications without side effects. Pt reports he is a \"9 out of 10 for most of the day, but I'm a 4 out 10 other parts of the day.\" He states that he was attempting to educate a peer about endorphins and this peer disclosed they attempting suicide. He reports that this triggered an adverse reaction where he shut down for several minutes. Pt was encouraged to practice proper boundaries, he continues to ramble tangentially and tends to invade personal space. Depakote will be increased to 1000mg bid. Recommended family meeting on Monday, 1/29/18.     Hospital Course     On 1/21/18, pt was admitted for manic behavior and agitation. He was started on Depakote 500mg bid. On 1/22/18, pt continued to exhibit tequila and lability. He will be continued on Depakote and provided Zyprexa and Seroquel PRN. On 1/23/18, pt was starting to become less manic and grandiose. Will increase Depakote to 750mg bid and valproic acid draw STAT. Aricept discontinued. On 1/24/18, pt reported that he was frustrated he was unable to utilize his CPAP properly. He will be moved to SDU as he is more appropriate with the medications and able to participate in groups. On 1/25/18, pt continued to have hypomanic behavior. He was encouraged to practice proper boundaries. Depakote will be increased to 1000mg bid. Planned for family meeting on 1/29/18.     Medications     Current Facility-Administered Medications Ordered in Epic   Medication Dose Route Frequency Last Rate Last Dose     divalproex (DEPAKOTE ER) 24 hr tablet 750 mg  750 mg Oral BID   750 mg at 01/25/18 0838     polyethylene glycol (MIRALAX/GLYCOLAX) Packet 17 g  17 g Oral Daily   17 g at 01/25/18 0835     hydrOXYzine (ATARAX) tablet 25-50 mg  25-50 mg Oral Q4H PRN   50 mg at 01/24/18 4908     aspirin " "chewable tablet 81 mg  81 mg Oral At Bedtime   81 mg at 01/24/18 2122     atorvastatin (LIPITOR) tablet 20 mg  20 mg Oral At Bedtime   20 mg at 01/24/18 2122     lamoTRIgine (LaMICtal) tablet 25 mg  25 mg Oral Daily   25 mg at 01/25/18 0838     lisinopril (PRINIVIL/ZESTRIL) tablet 5 mg  5 mg Oral At Bedtime   5 mg at 01/24/18 2122     magnesium oxide (MAG-OX) half-tab 200 mg  200 mg Oral Daily   200 mg at 01/25/18 0838     metFORMIN (GLUCOPHAGE-XR) 24 hr tablet 500 mg  500 mg Oral Daily with supper   500 mg at 01/24/18 1741     multivitamin, therapeutic (THERA-VIT) tablet 1 tablet  1 tablet Oral Daily   1 tablet at 01/25/18 0838     omega-3 acid ethyl esters (Lovaza) capsule 1 g  1 g Oral Daily   1 g at 01/25/18 0838     QUEtiapine (SEROquel) tablet 25 mg  25 mg Oral BID   25 mg at 01/25/18 0838     QUEtiapine (SEROquel) half-tab 37.5 mg  37.5 mg Oral At Bedtime   37.5 mg at 01/24/18 2122     QUEtiapine (SEROquel) tablet 25-50 mg  25-50 mg Oral BID PRN   50 mg at 01/23/18 0359     tamsulosin (FLOMAX) capsule 0.4 mg  0.4 mg Oral At Bedtime   0.4 mg at 01/24/18 2122     cholecalciferol (vitamin D3) tablet 1,000 Units  1,000 Units Oral At Bedtime   1,000 Units at 01/24/18 2122     OLANZapine (zyPREXA) tablet 5-10 mg  5-10 mg Oral Q6H PRN   10 mg at 01/24/18 2324     No current Epic-ordered outpatient prescriptions on file.         Allergies      No Known Allergies     Medical Review of Systems     /89  Pulse 61  Temp 97.4  F (36.3  C) (Oral)  Resp 15  Ht 1.753 m (5' 9\")  Wt 117.5 kg (259 lb)  SpO2 97%  BMI 38.25 kg/m2  Body mass index is 38.25 kg/(m^2).  A 10-point review of systems was performed by Jairo Mayo MD and is negative, no new findings.      Psychiatric Examination     Appearance Sitting in chair, dressed in scrubs. Appears stated age.   Attitude Cooperative, overly familiar   Orientation Oriented to person, place, time   Eye Contact Fair   Speech Improving, less hyperverbal and " circumstantial   Language Normal   Psychomotor Behavior Much less agitated   Mood Remains somewhat grandiose and euphoric   Affect Heightened, dramatic   Thought Process Linear   Associations Intact   Thought Content Denies suicidal or homicidal ideation.    Fund of Knowledge Average   Insight Slightly improved   Judgement Slightly improved   Attention Span & Concentration Improving   Recent & Remote Memory Improving   Gait Normal    Muscle Tone Intact on visual inspection.        Labs     Labs reviewed.  No results found for this or any previous visit (from the past 24 hour(s)).     Impression      This is a 72 year old male with a history of bipolar disorder, depression, and cognitive disorder who presents with manic behavior and agitation. He will remain on the unit for stabilization.      Diagnoses      1. Bipolar Disorder Type I, most recent episode manic with psychotic features.      Plan      1. Explained side effects, benefits, and complications of medications to the patient, Pt gave verbal consent.  2. Medication changes: Increase Depakote to 1000mg bid.  3. Discussed treatment plan with patient and team.  4. Projected length of stay: Until pt has been stabilized with aftercare in place, 5+ days.  5. Family meeting on 1/29/18 at 0900.      Attestation:   Patient has been seen and evaluated by me, Jairo Mayo MD.    Patient ID:  Name: Konstantin Morris   MRN: [id number]  Admission: 1/20/2018   YOB: 1945

## 2018-01-25 NOTE — PLAN OF CARE
Problem: Patient Care Overview  Goal: Team Discussion  Team Plan:   Outcome: No Change  BEHAVIORAL TEAM DISCUSSION    Participants: Dr. Mayo, Case Management, Nursing Staff, PA's  Progress: Same  Continued Stay Criteria/Rationale: Med compliance  Medical/Physical: Increase Depakote 1000 mg 2 times a day  Precautions:   Behavioral Orders   Procedures     Code 1 - Restrict to Unit     Routine Programming     As clinically indicated     Status 15     Every 15 minutes.     Plan: Increase Depakote 1000 mg 2x/day; enforce strict limits and boundaries  Rationale for change in precautions or plan: med compliance and boundaries

## 2018-01-25 NOTE — PLAN OF CARE
Problem: Manic Symptoms  Goal: Manic Symptoms  Signs and symptoms of listed problems will be absent or manageable.   Outcome: No Change  Pt was resting at the start of the shift, he was overly social and very active on the unit this morning.  Staff had to frequently remind him of healthy boundaries with some of the more impresonable patients.  During the focus group another patient was getting some extra attention when suddenly Konstantin started to; closes his eyes, shake his hands, sway in his chair, talking nonsensically, and then going quiet.  He responded to nailbed pressure and jerked his hand back from staff at this point he was escorted out of the lounge, when walking he was making himself heavy.  Once on bed staff walked out and in less then 5 minutes he walked out calmly and asked for some water.  He has been appropriate the rest of the shift.  Staff are continuing to enforce boundaries.  Patient denies active SI.

## 2018-01-25 NOTE — PLAN OF CARE
Problem: General Plan of Care (Inpatient Behavioral)  Goal: Discharge Planning  Patient attended Process Group on 1/24/18. Patient found it difficult to wait his turn, was reminded not to tell stories on other patients. He did share some anxieties and burst into uncontrollable sobbing. He was escorted back to his room and another staff person came in to check on on speak with him.

## 2018-01-25 NOTE — PROGRESS NOTES
"SPIRITUAL HEALTH SERVICES Progress Note  [facility]    Initial visit per pt request.  Pt largely told stories about his career, but included the family dynamics that he admits still influence his life today.  Pt displays some insight into shame and family dysfunction, and for this credits people he's known (e.g., Yosi English, Glen Hinojosa).  SH discussed, affirmed and encouraged pt to lean into this \"wisdom\" even as unit staff are developing an effective medication response for him.                                                                                                                                           Alonzo Shields M.Div., [facility]  Staff   Pager [phone]    "

## 2018-01-26 PROCEDURE — 25000132 ZZH RX MED GY IP 250 OP 250 PS 637: Performed by: INTERNAL MEDICINE

## 2018-01-26 PROCEDURE — 25000132 ZZH RX MED GY IP 250 OP 250 PS 637: Performed by: PSYCHIATRY & NEUROLOGY

## 2018-01-26 PROCEDURE — 90853 GROUP PSYCHOTHERAPY: CPT

## 2018-01-26 PROCEDURE — 12400006 ZZH R&B MH INTERMEDIATE

## 2018-01-26 RX ADMIN — QUETIAPINE 25 MG: 25 TABLET ORAL at 08:44

## 2018-01-26 RX ADMIN — Medication 200 MG: at 08:45

## 2018-01-26 RX ADMIN — POLYETHYLENE GLYCOL 3350 17 G: 17 POWDER, FOR SOLUTION ORAL at 08:45

## 2018-01-26 RX ADMIN — DIVALPROEX SODIUM 1000 MG: 500 TABLET, EXTENDED RELEASE ORAL at 20:37

## 2018-01-26 RX ADMIN — THERA TABS 1 TABLET: TAB at 08:44

## 2018-01-26 RX ADMIN — OMEGA-3-ACID ETHYL ESTERS 1 G: 1 CAPSULE, LIQUID FILLED ORAL at 08:44

## 2018-01-26 RX ADMIN — ASPIRIN 81 MG 81 MG: 81 TABLET ORAL at 22:02

## 2018-01-26 RX ADMIN — LAMOTRIGINE 25 MG: 25 TABLET ORAL at 08:45

## 2018-01-26 RX ADMIN — Medication 37.5 MG: at 22:02

## 2018-01-26 RX ADMIN — QUETIAPINE 25 MG: 25 TABLET ORAL at 15:20

## 2018-01-26 RX ADMIN — DIVALPROEX SODIUM 1000 MG: 500 TABLET, EXTENDED RELEASE ORAL at 08:43

## 2018-01-26 RX ADMIN — METFORMIN HYDROCHLORIDE 500 MG: 500 TABLET, EXTENDED RELEASE ORAL at 17:40

## 2018-01-26 RX ADMIN — LISINOPRIL 5 MG: 5 TABLET ORAL at 22:02

## 2018-01-26 RX ADMIN — ATORVASTATIN CALCIUM 20 MG: 10 TABLET, FILM COATED ORAL at 22:02

## 2018-01-26 RX ADMIN — CHOLECALCIFEROL TAB 25 MCG (1000 UNIT) 1000 UNITS: 25 TAB at 22:03

## 2018-01-26 RX ADMIN — TAMSULOSIN HYDROCHLORIDE 0.4 MG: 0.4 CAPSULE ORAL at 22:02

## 2018-01-26 ASSESSMENT — ACTIVITIES OF DAILY LIVING (ADL)
DRESS: INDEPENDENT
GROOMING: INDEPENDENT
LAUNDRY: WITH SUPERVISION
ORAL_HYGIENE: INDEPENDENT
GROOMING: INDEPENDENT
ORAL_HYGIENE: INDEPENDENT
DRESS: STREET CLOTHES;INDEPENDENT

## 2018-01-26 NOTE — PLAN OF CARE
Problem: Patient Care Overview  Goal: Discharge Needs Assessment  Patient attended a short Process Group today. He was able to follow along with some of the exercises related to relaxation. He was less intrusive during group today, but did ask multiple questions.

## 2018-01-26 NOTE — PROGRESS NOTES
[facility] Psychiatric Progress Note       Interim History     The patient's care was discussed with the treatment team and chart notes were reviewed. Pt seen on ITC. Tolerating medications without side effects. He states that his wife is not available for a family meeting early in the morning and would like it later in the afternoon. Suggested a family meeting on Thursday, 2/1/18 at 1400. He reports that his CPAP broke and he has to deal with a new apparatus, so he did not obtain much sleep last night. He is more cognizant of his pressured speech and attention seeking behavior. He is attempting to work on adjusting his behavior to practice better boundaries.     Hospital Course     On 1/21/18, pt was admitted for manic behavior and agitation. He was started on Depakote 500mg bid. On 1/22/18, pt continued to exhibit tequila and lability. He will be continued on Depakote and provided Zyprexa and Seroquel PRN. On 1/23/18, pt was starting to become less manic and grandiose. Will increase Depakote to 750mg bid and valproic acid draw STAT. Aricept discontinued. On 1/24/18, pt reported that he was frustrated he was unable to utilize his CPAP properly. He will be moved to SDU as he is more appropriate with the medications and able to participate in groups. On 1/25/18, pt continued to have hypomanic behavior. He was encouraged to practice proper boundaries. Depakote will be increased to 1000mg bid. Planned for family meeting on 1/29/18. On 1/26/18, pt was more able to recognize his disruptive behavior and is willing to practice better boundaries. He will continue on Depakote.     Medications     Current Facility-Administered Medications Ordered in Epic   Medication Dose Route Frequency Last Rate Last Dose     divalproex (DEPAKOTE ER) 24 hr tablet 1,000 mg  1,000 mg Oral BID   1,000 mg at 01/26/18 0845     polyethylene glycol (MIRALAX/GLYCOLAX) Packet 17 g  17 g Oral Daily   17 g at 01/26/18 0847      "hydrOXYzine (ATARAX) tablet 25-50 mg  25-50 mg Oral Q4H PRN   50 mg at 01/24/18 2324     aspirin chewable tablet 81 mg  81 mg Oral At Bedtime   81 mg at 01/25/18 2216     atorvastatin (LIPITOR) tablet 20 mg  20 mg Oral At Bedtime   20 mg at 01/25/18 2216     lamoTRIgine (LaMICtal) tablet 25 mg  25 mg Oral Daily   25 mg at 01/26/18 0845     lisinopril (PRINIVIL/ZESTRIL) tablet 5 mg  5 mg Oral At Bedtime   5 mg at 01/25/18 2216     magnesium oxide (MAG-OX) half-tab 200 mg  200 mg Oral Daily   200 mg at 01/26/18 0845     metFORMIN (GLUCOPHAGE-XR) 24 hr tablet 500 mg  500 mg Oral Daily with supper   500 mg at 01/25/18 1742     multivitamin, therapeutic (THERA-VIT) tablet 1 tablet  1 tablet Oral Daily   1 tablet at 01/26/18 0844     omega-3 acid ethyl esters (Lovaza) capsule 1 g  1 g Oral Daily   1 g at 01/26/18 0844     QUEtiapine (SEROquel) tablet 25 mg  25 mg Oral BID   25 mg at 01/26/18 0844     QUEtiapine (SEROquel) half-tab 37.5 mg  37.5 mg Oral At Bedtime   37.5 mg at 01/25/18 2216     QUEtiapine (SEROquel) tablet 25-50 mg  25-50 mg Oral BID PRN   50 mg at 01/25/18 1109     tamsulosin (FLOMAX) capsule 0.4 mg  0.4 mg Oral At Bedtime   0.4 mg at 01/25/18 2216     cholecalciferol (vitamin D3) tablet 1,000 Units  1,000 Units Oral At Bedtime   1,000 Units at 01/25/18 2215     OLANZapine (zyPREXA) tablet 5-10 mg  5-10 mg Oral Q6H PRN   10 mg at 01/24/18 2324     No current Epic-ordered outpatient prescriptions on file.         Allergies      No Known Allergies     Medical Review of Systems     BP (!) 144/106  Pulse 81  Temp 97.3  F (36.3  C) (Oral)  Resp 16  Ht 1.753 m (5' 9\")  Wt 117.5 kg (259 lb)  SpO2 97%  BMI 38.25 kg/m2  Body mass index is 38.25 kg/(m^2).  A 10-point review of systems was performed by Jairo Mayo MD and is negative, no new findings.      Psychiatric Examination     Appearance Sitting in chair, dressed in scrubs. Appears stated age.   Attitude Cooperative, overly familiar "   Orientation Oriented to person, place, time   Eye Contact Fair   Speech Improving, less hyperverbal and circumstantial   Language Normal   Psychomotor Behavior Normal   Mood Remains somewhat grandiose and euphoric   Affect More appropriate   Thought Process Linear   Associations Intact   Thought Content Denies suicidal or homicidal ideation.    Fund of Knowledge Average   Insight Slightly improved   Judgement Slightly improved   Attention Span & Concentration Improving   Recent & Remote Memory Intact   Gait Normal    Muscle Tone Intact on visual inspection.        Labs     Labs reviewed.  No results found for this or any previous visit (from the past 24 hour(s)).     Impression      This is a 72 year old male with a history of bipolar disorder, depression, and cognitive disorder who presents with manic behavior and agitation. He will remain on the unit for stabilization.      Diagnoses      1. Bipolar Disorder Type I, most recent episode manic with psychotic features.      Plan      1. Explained side effects, benefits, and complications of medications to the patient, Pt gave verbal consent.  2. Medication changes: Continue current medications.  3. Discussed treatment plan with patient and team.  4. Projected length of stay: Until pt has been stabilized with aftercare in place, 5+ days.  5. New family meeting time at 1400 on 2/1/18.      Attestation:   Patient has been seen and evaluated by me, Jairo Mayo MD.    Patient ID:  Name: Konstantin Morris   MRN: [id number]  Admission: 1/20/2018   YOB: 1945

## 2018-01-26 NOTE — PLAN OF CARE
Problem: Patient Care Overview  Goal: Discharge Needs Assessment   called patient's wife, Hodan ([phone] cell), to confirm family meeting for next week. Family meeting is scheduled for Thursday, February 1, 2018 at 1400. She stated that she will be there along with their adult daughters.

## 2018-01-26 NOTE — PLAN OF CARE
Problem: Manic Symptoms  Goal: Manic Symptoms  Signs and symptoms of listed problems will be absent or manageable.   Outcome: Improving  Pt was active and pleasant in the SDU. Pt presents with a full range affect and elated mood. Pt spent the majority of the day in the lounge, socializing with peers and staff. Pt was more cognitively aware of his boundaries and attention seeking behaviors today. Pt attended and participated appropriately in all groups, besides community meeting. Pt had a visit from a spiritual jonathan around 9:00. Pt's CPAP broke last night which led to him getting less sleep, but it did not affect the pt's mood. Pt ate all of his food and was med compliant.

## 2018-01-26 NOTE — PLAN OF CARE
Problem: Manic Symptoms  Goal: Manic Symptoms  Signs and symptoms of listed problems will be absent or manageable.   Less visible and less active. Resting in room more often. Wife visited and brought popcorn which he appropiately shared with other patients. He was friendly, social but not hypersocial or intrusive. Was not hyperverbal, speech not pressured . Mood more stable and less anxious with no impulsivity or irritability. Showed better boundaries , speech more sensible and was not disruptive.

## 2018-01-27 PROCEDURE — 25000132 ZZH RX MED GY IP 250 OP 250 PS 637: Performed by: INTERNAL MEDICINE

## 2018-01-27 PROCEDURE — 90853 GROUP PSYCHOTHERAPY: CPT

## 2018-01-27 PROCEDURE — 25000132 ZZH RX MED GY IP 250 OP 250 PS 637: Performed by: PSYCHIATRY & NEUROLOGY

## 2018-01-27 PROCEDURE — 40000275 ZZH STATISTIC RCP TIME EA 10 MIN

## 2018-01-27 PROCEDURE — 12400006 ZZH R&B MH INTERMEDIATE

## 2018-01-27 PROCEDURE — 94660 CPAP INITIATION&MGMT: CPT

## 2018-01-27 RX ADMIN — ATORVASTATIN CALCIUM 20 MG: 10 TABLET, FILM COATED ORAL at 20:28

## 2018-01-27 RX ADMIN — DIVALPROEX SODIUM 1000 MG: 500 TABLET, EXTENDED RELEASE ORAL at 08:16

## 2018-01-27 RX ADMIN — METFORMIN HYDROCHLORIDE 500 MG: 500 TABLET, EXTENDED RELEASE ORAL at 17:04

## 2018-01-27 RX ADMIN — LISINOPRIL 5 MG: 5 TABLET ORAL at 20:30

## 2018-01-27 RX ADMIN — OMEGA-3-ACID ETHYL ESTERS 1 G: 1 CAPSULE, LIQUID FILLED ORAL at 08:16

## 2018-01-27 RX ADMIN — QUETIAPINE 25 MG: 25 TABLET ORAL at 14:03

## 2018-01-27 RX ADMIN — Medication 37.5 MG: at 20:29

## 2018-01-27 RX ADMIN — DIVALPROEX SODIUM 1000 MG: 500 TABLET, EXTENDED RELEASE ORAL at 20:28

## 2018-01-27 RX ADMIN — ASPIRIN 81 MG 81 MG: 81 TABLET ORAL at 20:28

## 2018-01-27 RX ADMIN — POLYETHYLENE GLYCOL 3350 17 G: 17 POWDER, FOR SOLUTION ORAL at 08:21

## 2018-01-27 RX ADMIN — THERA TABS 1 TABLET: TAB at 08:16

## 2018-01-27 RX ADMIN — TAMSULOSIN HYDROCHLORIDE 0.4 MG: 0.4 CAPSULE ORAL at 20:29

## 2018-01-27 RX ADMIN — LAMOTRIGINE 25 MG: 25 TABLET ORAL at 08:16

## 2018-01-27 RX ADMIN — QUETIAPINE 25 MG: 25 TABLET ORAL at 08:16

## 2018-01-27 RX ADMIN — Medication 200 MG: at 08:16

## 2018-01-27 RX ADMIN — CHOLECALCIFEROL TAB 25 MCG (1000 UNIT) 1000 UNITS: 25 TAB at 20:27

## 2018-01-27 ASSESSMENT — ACTIVITIES OF DAILY LIVING (ADL)
GROOMING: INDEPENDENT
ORAL_HYGIENE: INDEPENDENT
DRESS: STREET CLOTHES;INDEPENDENT

## 2018-01-27 NOTE — PLAN OF CARE
Problem: Manic Symptoms  Goal: Manic Symptoms  Signs and symptoms of listed problems will be absent or manageable.   Outcome: Improving  Pt presented with a full-range affect. Bright/cheerful. Cooperative. Remains hypomanic. Tangential in conversation. Appears less grandiose. Needs to improve boundaries-asking to be friends with pt's after discharge. Redirectable. Wife wanting to talk to doctor-has questions regarding family meeting, and overall plan for pt.

## 2018-01-27 NOTE — PLAN OF CARE
"Problem: Manic Symptoms  Goal: Manic Symptoms  Signs and symptoms of listed problems will be absent or manageable.   Outcome: Improving  Patient remains hypomanic, but is improving each day. He appears less grandiose, but needs to work on improving his boundaries.  He had one brief altercation at dinner when he placed a tray in front of another peer & told them it was \"time to eat\".  The peer was playing a game & replied, \"you're not going to tell me what to do\"!  Patient was polite & cooperative with staff.  He denies any thoughts of self-harm & contracts for safety.  The patient realizes that he is not ready for discharge at this time.  His wife & daughter visited the remainder of the evening.      "

## 2018-01-28 LAB — VALPROATE SERPL-MCNC: 92 MG/L (ref 50–100)

## 2018-01-28 PROCEDURE — 25000132 ZZH RX MED GY IP 250 OP 250 PS 637: Performed by: PSYCHIATRY & NEUROLOGY

## 2018-01-28 PROCEDURE — 25000132 ZZH RX MED GY IP 250 OP 250 PS 637: Performed by: INTERNAL MEDICINE

## 2018-01-28 PROCEDURE — 80164 ASSAY DIPROPYLACETIC ACD TOT: CPT | Performed by: PSYCHIATRY & NEUROLOGY

## 2018-01-28 PROCEDURE — 12400006 ZZH R&B MH INTERMEDIATE

## 2018-01-28 PROCEDURE — 90853 GROUP PSYCHOTHERAPY: CPT

## 2018-01-28 PROCEDURE — 36415 COLL VENOUS BLD VENIPUNCTURE: CPT | Performed by: PSYCHIATRY & NEUROLOGY

## 2018-01-28 RX ADMIN — OMEGA-3-ACID ETHYL ESTERS 1 G: 1 CAPSULE, LIQUID FILLED ORAL at 09:10

## 2018-01-28 RX ADMIN — LISINOPRIL 5 MG: 5 TABLET ORAL at 21:03

## 2018-01-28 RX ADMIN — Medication 200 MG: at 09:11

## 2018-01-28 RX ADMIN — POLYETHYLENE GLYCOL 3350 17 G: 17 POWDER, FOR SOLUTION ORAL at 09:13

## 2018-01-28 RX ADMIN — LAMOTRIGINE 25 MG: 25 TABLET ORAL at 09:11

## 2018-01-28 RX ADMIN — Medication 37.5 MG: at 21:01

## 2018-01-28 RX ADMIN — DIVALPROEX SODIUM 1000 MG: 500 TABLET, EXTENDED RELEASE ORAL at 09:12

## 2018-01-28 RX ADMIN — QUETIAPINE 25 MG: 25 TABLET ORAL at 14:53

## 2018-01-28 RX ADMIN — CHOLECALCIFEROL TAB 25 MCG (1000 UNIT) 1000 UNITS: 25 TAB at 21:01

## 2018-01-28 RX ADMIN — TAMSULOSIN HYDROCHLORIDE 0.4 MG: 0.4 CAPSULE ORAL at 21:00

## 2018-01-28 RX ADMIN — QUETIAPINE 25 MG: 25 TABLET ORAL at 09:11

## 2018-01-28 RX ADMIN — ATORVASTATIN CALCIUM 20 MG: 10 TABLET, FILM COATED ORAL at 21:01

## 2018-01-28 RX ADMIN — METFORMIN HYDROCHLORIDE 500 MG: 500 TABLET, EXTENDED RELEASE ORAL at 17:36

## 2018-01-28 RX ADMIN — DIVALPROEX SODIUM 1000 MG: 500 TABLET, EXTENDED RELEASE ORAL at 20:57

## 2018-01-28 RX ADMIN — THERA TABS 1 TABLET: TAB at 09:11

## 2018-01-28 RX ADMIN — ASPIRIN 81 MG 81 MG: 81 TABLET ORAL at 21:00

## 2018-01-28 ASSESSMENT — ACTIVITIES OF DAILY LIVING (ADL)
GROOMING: INDEPENDENT
ORAL_HYGIENE: INDEPENDENT
DRESS: STREET CLOTHES;INDEPENDENT

## 2018-01-28 NOTE — PLAN OF CARE
Problem: Manic Symptoms  Goal: Manic Symptoms  Signs and symptoms of listed problems will be absent or manageable.   Outcome: No Change  Patient presented disorganized, displays short term memory and presents with poor boundaries. Patient attempted to work on a project but was disorganized, needed step by step directions. Patient was present on the unit, social at times and attended groups. He showered and changed into clean clothes this morning.

## 2018-01-28 NOTE — PLAN OF CARE
Problem: Manic Symptoms  Goal: Manic Symptoms  Signs and symptoms of listed problems will be absent or manageable.   Outcome: Improving  Patient remains hypomanic with some poor boundaries in which he asked several peers to be friends after discharge.  He feels that he will be discharged after his family meeting on Thursday, but is wife is concerned that he will not be ready.  Patient denies any suicidal ideation or thoughts of self-harm & contracts for safety.  He had many visitors throughout the shift & did not attend any groups.

## 2018-01-29 PROCEDURE — 25000132 ZZH RX MED GY IP 250 OP 250 PS 637: Performed by: PSYCHIATRY & NEUROLOGY

## 2018-01-29 PROCEDURE — 90853 GROUP PSYCHOTHERAPY: CPT

## 2018-01-29 PROCEDURE — 97150 GROUP THERAPEUTIC PROCEDURES: CPT | Mod: GO

## 2018-01-29 PROCEDURE — 12400006 ZZH R&B MH INTERMEDIATE

## 2018-01-29 PROCEDURE — 25000132 ZZH RX MED GY IP 250 OP 250 PS 637: Performed by: INTERNAL MEDICINE

## 2018-01-29 RX ADMIN — OMEGA-3-ACID ETHYL ESTERS 1 G: 1 CAPSULE, LIQUID FILLED ORAL at 08:46

## 2018-01-29 RX ADMIN — CHOLECALCIFEROL TAB 25 MCG (1000 UNIT) 1000 UNITS: 25 TAB at 21:51

## 2018-01-29 RX ADMIN — QUETIAPINE 25 MG: 25 TABLET ORAL at 14:47

## 2018-01-29 RX ADMIN — THERA TABS 1 TABLET: TAB at 08:46

## 2018-01-29 RX ADMIN — POLYETHYLENE GLYCOL 3350 17 G: 17 POWDER, FOR SOLUTION ORAL at 08:46

## 2018-01-29 RX ADMIN — Medication 37.5 MG: at 21:51

## 2018-01-29 RX ADMIN — ATORVASTATIN CALCIUM 20 MG: 10 TABLET, FILM COATED ORAL at 21:51

## 2018-01-29 RX ADMIN — LAMOTRIGINE 25 MG: 25 TABLET ORAL at 08:46

## 2018-01-29 RX ADMIN — DIVALPROEX SODIUM 1000 MG: 500 TABLET, EXTENDED RELEASE ORAL at 08:46

## 2018-01-29 RX ADMIN — DIVALPROEX SODIUM 1000 MG: 500 TABLET, EXTENDED RELEASE ORAL at 21:51

## 2018-01-29 RX ADMIN — METFORMIN HYDROCHLORIDE 500 MG: 500 TABLET, EXTENDED RELEASE ORAL at 16:40

## 2018-01-29 RX ADMIN — TAMSULOSIN HYDROCHLORIDE 0.4 MG: 0.4 CAPSULE ORAL at 21:51

## 2018-01-29 RX ADMIN — Medication 200 MG: at 08:46

## 2018-01-29 RX ADMIN — QUETIAPINE 25 MG: 25 TABLET ORAL at 08:46

## 2018-01-29 RX ADMIN — ASPIRIN 81 MG 81 MG: 81 TABLET ORAL at 21:51

## 2018-01-29 RX ADMIN — LISINOPRIL 5 MG: 5 TABLET ORAL at 21:51

## 2018-01-29 ASSESSMENT — ACTIVITIES OF DAILY LIVING (ADL)
ORAL_HYGIENE: INDEPENDENT
GROOMING: INDEPENDENT
GROOMING: INDEPENDENT
ORAL_HYGIENE: INDEPENDENT
DRESS: STREET CLOTHES
DRESS: STREET CLOTHES;INDEPENDENT

## 2018-01-29 NOTE — PLAN OF CARE
Problem: Manic Symptoms  Goal: Manic Symptoms  Signs and symptoms of listed problems will be absent or manageable.   Outcome: Improving  Patient was present and social on the unit. Attended groups and participated. Appeared to maintain appropriate boundaries. At times seems to have memory issues, and she tells the same stories often.

## 2018-01-29 NOTE — PLAN OF CARE
Problem: General Rehab Plan of Care  Goal: Occupational Therapy Goals  The patient and/or their representative will achieve their patient-specific goals related to the plan of care.  The patient-specific goals include:  Pt will engage in group activities to improve attention.   Pt participated in OT activity group today. Affect was tense, excessive. Pt exhibited poor memory, unsure which project was his. He asked peers to assist him with gathering supplies. Behavior was attention-seeking, as pt was able to do these tasks independently. Attention was poor, 5-10 minutes. Pt made little progress on his project. Speech was pressured and tangential. Pt told stories much of the time.

## 2018-01-29 NOTE — PLAN OF CARE
Problem: Manic Symptoms  Goal: Manic Symptoms  Signs and symptoms of listed problems will be absent or manageable.   Outcome: Improving  Patient states that he had a good day with many visitors.  He continues to appear disorganized with poor boundaries while asking other patients to be friends after discharge.  He feels that he will be ready for discharge after his family meeting on Thursday, but his wife expresses concern.  Patient attended both groups & participated well.  He denies any thoughts of self-harm & contracts for safety.

## 2018-01-29 NOTE — PROGRESS NOTES
[facility] Psychiatric Progress Note       Interim History     The patient's care was discussed with the treatment team and chart notes were reviewed. Pt seen on ITC. Tolerating medications without side effects. Pt is stabilizing on current medications.  Denies suicidal or homicidal ideation. He reports that his mood and organization are improving. He was able to speak with his old  in [place] this morning and enjoyed a visit from many visitors this weekend. VA level as of yesterday was 92, WNL and in therapeutic range. Her wife and daughters will be able to attend the family meeting later this week.      Hospital Course     On 1/21/18, pt was admitted for manic behavior and agitation. He was started on Depakote 500mg bid. On 1/22/18, pt continued to exhibit tequila and lability. He will be continued on Depakote and provided Zyprexa and Seroquel PRN. On 1/23/18, pt was starting to become less manic and grandiose. Will increase Depakote to 750mg bid and valproic acid draw STAT. Aricept discontinued. On 1/24/18, pt reported that he was frustrated he was unable to utilize his CPAP properly. He will be moved to SDU as he is more appropriate with the medications and able to participate in groups. On 1/25/18, pt continued to have hypomanic behavior. He was encouraged to practice proper boundaries. Depakote will be increased to 1000mg bid. Planned for family meeting on 1/29/18. On 1/26/18, pt was more able to recognize his disruptive behavior and is willing to practice better boundaries. He will continue on Depakote. On 1/29/18, pt continues to improve and stabilize while on the unit, he remains somewhat hyperverbal and euphoric. VA level was 92.      Medications     Current Facility-Administered Medications Ordered in Epic   Medication Dose Route Frequency Last Rate Last Dose     divalproex (DEPAKOTE ER) 24 hr tablet 1,000 mg  1,000 mg Oral BID   1,000 mg at 01/29/18 0352     polyethylene glycol  "(MIRALAX/GLYCOLAX) Packet 17 g  17 g Oral Daily   17 g at 01/29/18 0846     hydrOXYzine (ATARAX) tablet 25-50 mg  25-50 mg Oral Q4H PRN   50 mg at 01/24/18 2324     aspirin chewable tablet 81 mg  81 mg Oral At Bedtime   81 mg at 01/28/18 2100     atorvastatin (LIPITOR) tablet 20 mg  20 mg Oral At Bedtime   20 mg at 01/28/18 2101     lamoTRIgine (LaMICtal) tablet 25 mg  25 mg Oral Daily   25 mg at 01/29/18 0846     lisinopril (PRINIVIL/ZESTRIL) tablet 5 mg  5 mg Oral At Bedtime   5 mg at 01/28/18 2103     magnesium oxide (MAG-OX) half-tab 200 mg  200 mg Oral Daily   200 mg at 01/29/18 0846     metFORMIN (GLUCOPHAGE-XR) 24 hr tablet 500 mg  500 mg Oral Daily with supper   500 mg at 01/28/18 1736     multivitamin, therapeutic (THERA-VIT) tablet 1 tablet  1 tablet Oral Daily   1 tablet at 01/29/18 0846     omega-3 acid ethyl esters (Lovaza) capsule 1 g  1 g Oral Daily   1 g at 01/29/18 0846     QUEtiapine (SEROquel) tablet 25 mg  25 mg Oral BID   25 mg at 01/29/18 0846     QUEtiapine (SEROquel) half-tab 37.5 mg  37.5 mg Oral At Bedtime   37.5 mg at 01/28/18 2101     QUEtiapine (SEROquel) tablet 25-50 mg  25-50 mg Oral BID PRN   50 mg at 01/25/18 1109     tamsulosin (FLOMAX) capsule 0.4 mg  0.4 mg Oral At Bedtime   0.4 mg at 01/28/18 2100     cholecalciferol (vitamin D3) tablet 1,000 Units  1,000 Units Oral At Bedtime   1,000 Units at 01/28/18 2101     OLANZapine (zyPREXA) tablet 5-10 mg  5-10 mg Oral Q6H PRN   10 mg at 01/24/18 2324     No current Epic-ordered outpatient prescriptions on file.         Allergies      No Known Allergies     Medical Review of Systems     /89  Pulse 81  Temp 98.1  F (36.7  C) (Oral)  Resp 16  Ht 1.753 m (5' 9\")  Wt 117.5 kg (259 lb)  SpO2 97%  BMI 38.25 kg/m2  Body mass index is 38.25 kg/(m^2).  A 10-point review of systems was performed by Jairo Mayo MD and is negative, no new findings.      Psychiatric Examination     Appearance Sitting in chair, dressed in scrubs. " Appears stated age.   Attitude Cooperative, slightly less intrusive   Orientation Oriented to person, place, time   Eye Contact Fair   Speech Improving, less hyperverbal and circumstantial   Language Normal   Psychomotor Behavior Normal   Mood Remains somewhat grandiose and euphoric   Affect Broad range   Thought Process Linear   Associations Intact   Thought Content Denies suicidal or homicidal ideation.    Fund of Knowledge Average   Insight Slightly improved   Judgement Slightly improved   Attention Span & Concentration Improving   Recent & Remote Memory Intact   Gait Normal    Muscle Tone Intact on visual inspection.        Labs     Labs reviewed.  No results found for this or any previous visit (from the past 24 hour(s)).     Impression      This is a 72 year old male with a history of bipolar disorder, depression, and cognitive disorder who presents with manic behavior and agitation. He will remain on the unit for stabilization.      Diagnoses      1. Bipolar Disorder Type I, most recent episode manic with psychotic features.      Plan      1. Explained side effects, benefits, and complications of medications to the patient, Pt gave verbal consent.  2. Medication changes: Continue current medications.  3. Discussed treatment plan with patient and team.  4. Projected length of stay: Until pt has been stabilized with aftercare in place, 5+ days.  5. Family meeting at 1400 on 2/1/18.      Attestation:   Patient has been seen and evaluated by me, Jairo Mayo MD.    Patient ID:  Name: Konstantin Morris   MRN: [id number]  Admission: 1/20/2018   YOB: 1945

## 2018-01-30 PROCEDURE — 94660 CPAP INITIATION&MGMT: CPT

## 2018-01-30 PROCEDURE — 12400006 ZZH R&B MH INTERMEDIATE

## 2018-01-30 PROCEDURE — 40000275 ZZH STATISTIC RCP TIME EA 10 MIN

## 2018-01-30 PROCEDURE — 25000132 ZZH RX MED GY IP 250 OP 250 PS 637: Performed by: INTERNAL MEDICINE

## 2018-01-30 PROCEDURE — 90853 GROUP PSYCHOTHERAPY: CPT

## 2018-01-30 PROCEDURE — 25000132 ZZH RX MED GY IP 250 OP 250 PS 637: Performed by: PSYCHIATRY & NEUROLOGY

## 2018-01-30 RX ORDER — QUETIAPINE FUMARATE 100 MG/1
100 TABLET, FILM COATED ORAL AT BEDTIME
Status: DISCONTINUED | OUTPATIENT
Start: 2018-01-30 | End: 2018-02-01 | Stop reason: HOSPADM

## 2018-01-30 RX ORDER — QUETIAPINE FUMARATE 50 MG/1
50 TABLET, FILM COATED ORAL AT BEDTIME
Status: DISCONTINUED | OUTPATIENT
Start: 2018-01-30 | End: 2018-01-30

## 2018-01-30 RX ADMIN — ATORVASTATIN CALCIUM 20 MG: 10 TABLET, FILM COATED ORAL at 21:21

## 2018-01-30 RX ADMIN — LAMOTRIGINE 25 MG: 25 TABLET ORAL at 08:46

## 2018-01-30 RX ADMIN — LISINOPRIL 5 MG: 5 TABLET ORAL at 21:22

## 2018-01-30 RX ADMIN — THERA TABS 1 TABLET: TAB at 08:46

## 2018-01-30 RX ADMIN — TAMSULOSIN HYDROCHLORIDE 0.4 MG: 0.4 CAPSULE ORAL at 21:22

## 2018-01-30 RX ADMIN — DIVALPROEX SODIUM 1000 MG: 500 TABLET, EXTENDED RELEASE ORAL at 21:22

## 2018-01-30 RX ADMIN — DIVALPROEX SODIUM 1000 MG: 500 TABLET, EXTENDED RELEASE ORAL at 08:46

## 2018-01-30 RX ADMIN — OMEGA-3-ACID ETHYL ESTERS 1 G: 1 CAPSULE, LIQUID FILLED ORAL at 08:45

## 2018-01-30 RX ADMIN — QUETIAPINE FUMARATE 100 MG: 100 TABLET ORAL at 21:22

## 2018-01-30 RX ADMIN — METFORMIN HYDROCHLORIDE 500 MG: 500 TABLET, EXTENDED RELEASE ORAL at 17:59

## 2018-01-30 RX ADMIN — POLYETHYLENE GLYCOL 3350 17 G: 17 POWDER, FOR SOLUTION ORAL at 08:45

## 2018-01-30 RX ADMIN — QUETIAPINE 25 MG: 25 TABLET ORAL at 14:52

## 2018-01-30 RX ADMIN — QUETIAPINE 25 MG: 25 TABLET ORAL at 08:46

## 2018-01-30 RX ADMIN — ASPIRIN 81 MG 81 MG: 81 TABLET ORAL at 21:22

## 2018-01-30 RX ADMIN — Medication 200 MG: at 08:46

## 2018-01-30 RX ADMIN — CHOLECALCIFEROL TAB 25 MCG (1000 UNIT) 1000 UNITS: 25 TAB at 21:22

## 2018-01-30 ASSESSMENT — ACTIVITIES OF DAILY LIVING (ADL)
GROOMING: INDEPENDENT
ORAL_HYGIENE: INDEPENDENT
LAUNDRY: WITH SUPERVISION
DRESS: INDEPENDENT;STREET CLOTHES
ORAL_HYGIENE: INDEPENDENT
DRESS: INDEPENDENT
LAUNDRY: WITH SUPERVISION
GROOMING: INDEPENDENT;SHOWER

## 2018-01-30 NOTE — PROGRESS NOTES
[facility] Psychiatric Progress Note       Interim History     The patient's care was discussed with the treatment team and chart notes were reviewed. Pt seen on ITC. Tolerating medications without side effects. Pt is stabilizing on current medications.  Denies suicidal or homicidal ideation. He reports that his memory is improving although staff states that he has been having issues with attention and memory. He states he has had ECT in the past which affected his memory temporarily. He is sleeping better with his CPAP. Discussed starting pt on Seroquel.  Reviewed the side effects, benefits, and complications of medication. Pt gave verbal agreement to begin Seroquel 100mg qhs. Discontinued Lamictal.     Hospital Course     On 1/21/18, pt was admitted for manic behavior and agitation. He was started on Depakote 500mg bid. On 1/22/18, pt continued to exhibit tequila and lability. He will be continued on Depakote and provided Zyprexa and Seroquel PRN. On 1/23/18, pt was starting to become less manic and grandiose. Will increase Depakote to 750mg bid and valproic acid draw STAT. Aricept discontinued. On 1/24/18, pt reported that he was frustrated he was unable to utilize his CPAP properly. He will be moved to SDU as he is more appropriate with the medications and able to participate in groups. On 1/25/18, pt continued to have hypomanic behavior. He was encouraged to practice proper boundaries. Depakote will be increased to 1000mg bid. Planned for family meeting on 1/29/18. On 1/26/18, pt was more able to recognize his disruptive behavior and is willing to practice better boundaries. He will continue on Depakote. On 1/29/18, pt continues to improve and stabilize while on the unit, he remains somewhat hyperverbal and euphoric. VA level was 92. On 1/30/18, staff continued to report that pt had issues with memory and attention, but pt reports it is improving. He had some issues sleeping at night. Seroquel  100mg qhs was initiated. Lamictal was discontinued.     Medications     Current Facility-Administered Medications Ordered in Epic   Medication Dose Route Frequency Last Rate Last Dose     divalproex (DEPAKOTE ER) 24 hr tablet 1,000 mg  1,000 mg Oral BID   1,000 mg at 01/30/18 0846     polyethylene glycol (MIRALAX/GLYCOLAX) Packet 17 g  17 g Oral Daily   17 g at 01/30/18 0845     hydrOXYzine (ATARAX) tablet 25-50 mg  25-50 mg Oral Q4H PRN   50 mg at 01/24/18 2324     aspirin chewable tablet 81 mg  81 mg Oral At Bedtime   81 mg at 01/29/18 2151     atorvastatin (LIPITOR) tablet 20 mg  20 mg Oral At Bedtime   20 mg at 01/29/18 2151     lamoTRIgine (LaMICtal) tablet 25 mg  25 mg Oral Daily   25 mg at 01/30/18 0846     lisinopril (PRINIVIL/ZESTRIL) tablet 5 mg  5 mg Oral At Bedtime   5 mg at 01/29/18 2151     magnesium oxide (MAG-OX) half-tab 200 mg  200 mg Oral Daily   200 mg at 01/30/18 0846     metFORMIN (GLUCOPHAGE-XR) 24 hr tablet 500 mg  500 mg Oral Daily with supper   500 mg at 01/29/18 1640     multivitamin, therapeutic (THERA-VIT) tablet 1 tablet  1 tablet Oral Daily   1 tablet at 01/30/18 0846     omega-3 acid ethyl esters (Lovaza) capsule 1 g  1 g Oral Daily   1 g at 01/30/18 0845     QUEtiapine (SEROquel) tablet 25 mg  25 mg Oral BID   25 mg at 01/30/18 0846     QUEtiapine (SEROquel) half-tab 37.5 mg  37.5 mg Oral At Bedtime   37.5 mg at 01/29/18 2151     QUEtiapine (SEROquel) tablet 25-50 mg  25-50 mg Oral BID PRN   50 mg at 01/25/18 1109     tamsulosin (FLOMAX) capsule 0.4 mg  0.4 mg Oral At Bedtime   0.4 mg at 01/29/18 2151     cholecalciferol (vitamin D3) tablet 1,000 Units  1,000 Units Oral At Bedtime   1,000 Units at 01/29/18 2151     OLANZapine (zyPREXA) tablet 5-10 mg  5-10 mg Oral Q6H PRN   10 mg at 01/24/18 8261     No current Epic-ordered outpatient prescriptions on file.         Allergies      No Known Allergies     Medical Review of Systems     /88  Pulse 78  Temp 98  F (36.7  C) (Oral)   "Resp 15  Ht 1.753 m (5' 9\")  Wt 117.5 kg (259 lb)  SpO2 97%  BMI 38.25 kg/m2  Body mass index is 38.25 kg/(m^2).  A 10-point review of systems was performed by Jairo Mayo MD and is negative, no new findings.      Psychiatric Examination     Appearance Sitting in chair, dressed in scrubs. Appears stated age.   Attitude Cooperative, slightly less intrusive, pleasant   Orientation Oriented to person, place, time   Eye Contact Fair   Speech Improving, much less hyperverbal and circumstantial   Language Normal   Psychomotor Behavior Normal   Mood Less grandiose and euphoric   Affect Broad range   Thought Process Linear   Associations Intact   Thought Content Denies suicidal or homicidal ideation.    Fund of Knowledge Average   Insight Slightly improved   Judgement Slightly improved   Attention Span & Concentration Remains impaired   Recent & Remote Memory Intact   Gait Normal    Muscle Tone Intact on visual inspection.        Labs     Labs reviewed.  No results found for this or any previous visit (from the past 24 hour(s)).     Impression      This is a 72 year old male with a history of bipolar disorder, depression, and cognitive disorder who presents with manic behavior and agitation. He will remain on the unit for stabilization.      Diagnoses      1. Bipolar Disorder Type I, most recent episode manic with psychotic features.      Plan      1. Explained side effects, benefits, and complications of medications to the patient, Pt gave verbal consent.  2. Medication changes:  Begin Seroquel 100mg qhs. Discontinue Lamictal.  3. Discussed treatment plan with patient and team.  4. Projected length of stay: Until pt has been stabilized with aftercare in place, 5+ days.  5. Family meeting at 1400 on 2/1/18.      Attestation:   Patient has been seen and evaluated by me, Jairo Mayo MD.    Patient ID:  Name: Konstantin Morris   MRN: [id number]  Admission: 1/20/2018   YOB: 1945   "

## 2018-01-30 NOTE — PLAN OF CARE
Problem: Manic Symptoms  Goal: Manic Symptoms  Signs and symptoms of listed problems will be absent or manageable.   Outcome: Improving  Patient presents with a full range affect. He was visible and social on the unit. Patient attended all groups and participated appropriately and with insight. Patient denies any side effects from medication. Patient spent most of the shift in the lounge with staff and peers. He is hoping to be able to discharge by Friday. Patient has been pleasant and cooperative with staff and peers.

## 2018-01-30 NOTE — PLAN OF CARE
"Problem: Manic Symptoms  Goal: Manic Symptoms  Signs and symptoms of listed problems will be absent or manageable.   Outcome: Improving  Patient remains hyperverbal & euphoric.  He appears to be forgetful & disorganized, while leaving his visitors in the kitchen conference room while he is in the lounge.  When confronted about it, patient replied, \"Oh yeah, tell them to come down here\".  He denies any thoughts of self-harm.  The patient has difficulty talking about feelings,etc.  During the 1:1, patient would prefer to talk about other people or books he has read.  He attended all milieu activities & was social with all.      "

## 2018-01-30 NOTE — PLAN OF CARE
Problem: Patient Care Overview  Goal: Team Discussion  Team Plan:   Outcome: Improving  BEHAVIORAL TEAM DISCUSSION    Participants: Dr. Mayo, Case Management, Nursing staff, Psych associates  Progress: Patient improving                 Continued Stay Criteria/Rationale: Patient needs further stabilization.  Medical/Physical: n/a  Precautions:   Behavioral Orders   Procedures     Code 1 - Restrict to Unit     Routine Programming     As clinically indicated     Status 15     Every 15 minutes.     Plan: continue stabilization  Rationale for change in precautions or plan: patient improving.

## 2018-01-30 NOTE — PLAN OF CARE
Problem: Patient Care Overview  Goal: Team Discussion  Team Plan:   Outcome: Improving  BEHAVIORAL TEAM DISCUSSION    Participants: Dr. Mayo, Case Management, Nursing staff, Psych associates  Progress: Patient presents with a full range affect. He stated that he feels improvement since admission and is hoping to be able to discharge by the end of the week.  Continued Stay Criteria/Rationale: Patient improving but needs further stabilization on medication  Medical/Physical: n/a  Precautions:   Behavioral Orders   Procedures     Code 1 - Restrict to Unit     Routine Programming     As clinically indicated     Status 15     Every 15 minutes.     Plan: Have patient continue to stabilize on the unit.  Rationale for change in precautions or plan: patient improving.

## 2018-01-31 PROCEDURE — 97150 GROUP THERAPEUTIC PROCEDURES: CPT | Mod: GO

## 2018-01-31 PROCEDURE — 25000132 ZZH RX MED GY IP 250 OP 250 PS 637: Performed by: PSYCHIATRY & NEUROLOGY

## 2018-01-31 PROCEDURE — 90853 GROUP PSYCHOTHERAPY: CPT

## 2018-01-31 PROCEDURE — 12400006 ZZH R&B MH INTERMEDIATE

## 2018-01-31 PROCEDURE — 25000132 ZZH RX MED GY IP 250 OP 250 PS 637: Performed by: INTERNAL MEDICINE

## 2018-01-31 RX ADMIN — CHOLECALCIFEROL TAB 25 MCG (1000 UNIT) 1000 UNITS: 25 TAB at 21:55

## 2018-01-31 RX ADMIN — QUETIAPINE FUMARATE 100 MG: 100 TABLET ORAL at 20:22

## 2018-01-31 RX ADMIN — ATORVASTATIN CALCIUM 20 MG: 10 TABLET, FILM COATED ORAL at 21:55

## 2018-01-31 RX ADMIN — THERA TABS 1 TABLET: TAB at 09:16

## 2018-01-31 RX ADMIN — DIVALPROEX SODIUM 1000 MG: 500 TABLET, EXTENDED RELEASE ORAL at 09:16

## 2018-01-31 RX ADMIN — QUETIAPINE 25 MG: 25 TABLET ORAL at 15:51

## 2018-01-31 RX ADMIN — QUETIAPINE 25 MG: 25 TABLET ORAL at 09:16

## 2018-01-31 RX ADMIN — DIVALPROEX SODIUM 1000 MG: 500 TABLET, EXTENDED RELEASE ORAL at 20:22

## 2018-01-31 RX ADMIN — OMEGA-3-ACID ETHYL ESTERS 1 G: 1 CAPSULE, LIQUID FILLED ORAL at 09:16

## 2018-01-31 RX ADMIN — Medication 200 MG: at 09:16

## 2018-01-31 RX ADMIN — LISINOPRIL 5 MG: 5 TABLET ORAL at 21:55

## 2018-01-31 RX ADMIN — POLYETHYLENE GLYCOL 3350 17 G: 17 POWDER, FOR SOLUTION ORAL at 09:16

## 2018-01-31 RX ADMIN — TAMSULOSIN HYDROCHLORIDE 0.4 MG: 0.4 CAPSULE ORAL at 21:55

## 2018-01-31 RX ADMIN — METFORMIN HYDROCHLORIDE 500 MG: 500 TABLET, EXTENDED RELEASE ORAL at 17:39

## 2018-01-31 RX ADMIN — ASPIRIN 81 MG 81 MG: 81 TABLET ORAL at 21:55

## 2018-01-31 NOTE — DISCHARGE INSTRUCTIONS
Behavioral Discharge Planning and Instructions    Summary: Admitted with Iwona    Main Diagnosis: Bipolar Disorder Type I, most recent episode manic with psychotic features.    Lifestyle Adjustment: Please follow with psychiatrist and intensive outpatient program    Psychiatry Follow-up:     [facility]  [id number]  [place]  [phone]  Fax [phone]  Intake appointment for [facility] [place] with _Chriss Boo on Tuesday 2/6/18 @ 8:30 paperwork and interview @ 9 am._    [facility]  [address] , [address]  [place]  [phone]  Fax [phone]  Appointment with Dr. Jairo Mayo MD on Friday, February 9 at 3:15 PM    Resources:   Crisis Intervention: [phone] or [phone] (TTY: [phone]).  Call anytime for help.  National [place] on Mental Illness (www.[place].terra.org): 687.408.9383 or 958-703-9811.  Suicide Awareness Voices of Education (SAVE) (www.save.org): 848-709-OQTE (2283)  National Suicide Prevention Line (www.mentalhealthmn.org): 748-792-UQYS (0670)  Mental Health Consumer/Survivor Network of [place] (www.mhcsn.net): 844.717.7492 or 398-359-6381  Mental Health Association of [place] (www.mentalhealth.org): 367.106.7892 or 123-655-7825    General Medication Instructions:   See your medication sheet(s) for instructions.   Take all medicines as directed.  Make no changes unless your doctor suggests them.   Go to all your doctor visits.  Be sure to have all your required lab tests. This way, your medicines can be refilled on time.  Do not use any drugs not prescribed by your doctor.  Avoid alcohol.

## 2018-01-31 NOTE — PROGRESS NOTES
[facility] Psychiatric Progress Note       Interim History     The patient's care was discussed with the treatment team and chart notes were reviewed. Pt seen on ITC. Tolerating medications without side effects. Pt is stabilizing on current medications.  Denies suicidal or homicidal ideation. Spoke with pt's wife yesterday. She believes that pt is improving, he has has always been a storyteller and copious in his speech. She states that pt's manic behavior correlated with him starting Aricept. She will be able to make the family meeting tomorrow in the afternoon. He reports being able to sleep better last night with Seroquel, he continues to have issues adjusting with his new CPAP. Encouraged pt to attend [facility] at [facility] to build a support network and develop methods to cope with pt's anxiety and depression. Pt acknowledged.      Hospital Course     On 1/21/18, pt was admitted for manic behavior and agitation. He was started on Depakote 500mg bid. On 1/22/18, pt continued to exhibit tequila and lability. He will be continued on Depakote and provided Zyprexa and Seroquel PRN. On 1/23/18, pt was starting to become less manic and grandiose. Will increase Depakote to 750mg bid and valproic acid draw STAT. Aricept discontinued. On 1/24/18, pt reported that he was frustrated he was unable to utilize his CPAP properly. He will be moved to SDU as he is more appropriate with the medications and able to participate in groups. On 1/25/18, pt continued to have hypomanic behavior. He was encouraged to practice proper boundaries. Depakote will be increased to 1000mg bid. Planned for family meeting on 1/29/18. On 1/26/18, pt was more able to recognize his disruptive behavior and is willing to practice better boundaries. He will continue on Depakote. On 1/29/18, pt continues to improve and stabilize while on the unit, he remains somewhat hyperverbal and euphoric. VA level was 92. On 1/30/18, staff  continued to report that pt had issues with memory and attention, but pt reports it is improving. He had some issues sleeping at night. Seroquel 100mg qhs was initiated. Lamictal was discontinued. On 1/31/18, spoke with pt's wife to collect history and confirm family meeting for tomorrow. Pt reported feeling better and sleeping with Seroquel. Recommended that pt attend [facility] at [facility] [place].     Medications     Current Facility-Administered Medications Ordered in Epic   Medication Dose Route Frequency Last Rate Last Dose     QUEtiapine (SEROquel) tablet 100 mg  100 mg Oral At Bedtime   100 mg at 01/30/18 2122     divalproex (DEPAKOTE ER) 24 hr tablet 1,000 mg  1,000 mg Oral BID   1,000 mg at 01/31/18 0916     polyethylene glycol (MIRALAX/GLYCOLAX) Packet 17 g  17 g Oral Daily   17 g at 01/31/18 0916     hydrOXYzine (ATARAX) tablet 25-50 mg  25-50 mg Oral Q4H PRN   50 mg at 01/24/18 2324     aspirin chewable tablet 81 mg  81 mg Oral At Bedtime   81 mg at 01/30/18 2122     atorvastatin (LIPITOR) tablet 20 mg  20 mg Oral At Bedtime   20 mg at 01/30/18 2121     lisinopril (PRINIVIL/ZESTRIL) tablet 5 mg  5 mg Oral At Bedtime   5 mg at 01/30/18 2122     magnesium oxide (MAG-OX) half-tab 200 mg  200 mg Oral Daily   200 mg at 01/31/18 0916     metFORMIN (GLUCOPHAGE-XR) 24 hr tablet 500 mg  500 mg Oral Daily with supper   500 mg at 01/30/18 1759     multivitamin, therapeutic (THERA-VIT) tablet 1 tablet  1 tablet Oral Daily   1 tablet at 01/31/18 0916     omega-3 acid ethyl esters (Lovaza) capsule 1 g  1 g Oral Daily   1 g at 01/31/18 0916     QUEtiapine (SEROquel) tablet 25 mg  25 mg Oral BID   25 mg at 01/31/18 0916     QUEtiapine (SEROquel) tablet 25-50 mg  25-50 mg Oral BID PRN   50 mg at 01/25/18 1109     tamsulosin (FLOMAX) capsule 0.4 mg  0.4 mg Oral At Bedtime   0.4 mg at 01/30/18 2122     cholecalciferol (vitamin D3) tablet 1,000 Units  1,000 Units Oral At Bedtime   1,000 Units at 01/30/18 2122     OLANZapine  "(zyPREXA) tablet 5-10 mg  5-10 mg Oral Q6H PRN   10 mg at 01/24/18 5641     No current Epic-ordered outpatient prescriptions on file.         Allergies      No Known Allergies     Medical Review of Systems     /88  Pulse 67  Temp 97.3  F (36.3  C) (Oral)  Resp 15  Ht 1.753 m (5' 9\")  Wt 117.5 kg (259 lb)  SpO2 97%  BMI 38.25 kg/m2  Body mass index is 38.25 kg/(m^2).  A 10-point review of systems was performed by Jairo Mayo MD and is negative, no new findings.      Psychiatric Examination     Appearance Sitting in chair, dressed in scrubs. Appears stated age.   Attitude Cooperative, less intrusive, pleasant   Orientation Oriented to person, place, time   Eye Contact Fair   Speech Improving, remains copious   Language Normal   Psychomotor Behavior Normal   Mood Less grandiose and euphoric   Affect Broad range   Thought Process Improving   Associations Intact   Thought Content Denies suicidal or homicidal ideation.    Fund of Knowledge Average   Insight Slightly improved   Judgement Improving   Attention Span & Concentration Improving   Recent & Remote Memory Intact   Gait Normal    Muscle Tone Intact on visual inspection.        Labs     Labs reviewed.  No results found for this or any previous visit (from the past 24 hour(s)).     Impression      This is a 72 year old male with a history of bipolar disorder, depression, and cognitive disorder who presents with manic behavior and agitation. He will remain on the unit for stabilization.      Diagnoses      1. Bipolar Disorder Type I, most recent episode manic with psychotic features.      Plan      1. Explained side effects, benefits, and complications of medications to the patient, Pt gave verbal consent.  2. Medication changes:  Continue current medications.  3. Discussed treatment plan with patient and team.  4. Projected length of stay: Until pt has been stabilized with aftercare in place, 5+ days.  5. Family meeting at 1400 on " 2/1/18.      Attestation:   Patient has been seen and evaluated by me, Jairo Mayo MD.    Patient ID:  Name: Konstantin Morris   MRN: [id number]  Admission: 1/20/2018   YOB: 1945

## 2018-01-31 NOTE — PLAN OF CARE
Problem: General Plan of Care (Inpatient Behavioral)  Goal: Individualization/Patient Specific Goal (IP Behavioral)  The patient and/or their representative will achieve their patient-specific goals related to the plan of care.    The patient-specific goals include:  Decrease pressured speech,   Decrease disorganized behavior,   Decrease anxiety.   Outcome: Improving  Continues with appropriate behavior in groups and on unit. Interacting with peers and staff appropriately. Speech pressured at times but no delusional themes. Medication compliant, reports he is getting benefits from his current regimen. Looking forward to discharge, planning 1-2 days. No thoughts of self harm or suicide.

## 2018-01-31 NOTE — PLAN OF CARE
Problem: Manic Symptoms  Goal: Manic Symptoms  Signs and symptoms of listed problems will be absent or manageable.   Outcome: No Change  Pt visible on the unit, cooperative. Speech appears pressured at times. Went to one group. During 1:1 Pt said he was disappointed his sheets were not changed daily here on 77 as they would be on most other units. He claims he has also only showered twice since his admission. Pt showered & his sheets were changed. Needs his personal laundry done, however. Pt does not realize his personal clothes smell.

## 2018-02-01 VITALS
SYSTOLIC BLOOD PRESSURE: 122 MMHG | TEMPERATURE: 98.4 F | WEIGHT: 261.9 LBS | OXYGEN SATURATION: 97 % | DIASTOLIC BLOOD PRESSURE: 78 MMHG | RESPIRATION RATE: 15 BRPM | HEIGHT: 69 IN | HEART RATE: 56 BPM | BODY MASS INDEX: 38.79 KG/M2

## 2018-02-01 PROCEDURE — 90853 GROUP PSYCHOTHERAPY: CPT

## 2018-02-01 PROCEDURE — 25000132 ZZH RX MED GY IP 250 OP 250 PS 637: Performed by: INTERNAL MEDICINE

## 2018-02-01 PROCEDURE — 25000132 ZZH RX MED GY IP 250 OP 250 PS 637: Performed by: PSYCHIATRY & NEUROLOGY

## 2018-02-01 RX ORDER — QUETIAPINE FUMARATE 25 MG/1
25 TABLET, FILM COATED ORAL 2 TIMES DAILY
Qty: 60 TABLET | Refills: 0 | Status: SHIPPED | OUTPATIENT
Start: 2018-02-01

## 2018-02-01 RX ORDER — QUETIAPINE FUMARATE 100 MG/1
100 TABLET, FILM COATED ORAL AT BEDTIME
Qty: 30 TABLET | Refills: 0 | Status: SHIPPED | OUTPATIENT
Start: 2018-02-01

## 2018-02-01 RX ORDER — DIVALPROEX SODIUM 500 MG/1
1000 TABLET, EXTENDED RELEASE ORAL 2 TIMES DAILY
Qty: 120 TABLET | Refills: 0 | Status: SHIPPED | OUTPATIENT
Start: 2018-02-01

## 2018-02-01 RX ADMIN — Medication 200 MG: at 08:13

## 2018-02-01 RX ADMIN — POLYETHYLENE GLYCOL 3350 17 G: 17 POWDER, FOR SOLUTION ORAL at 08:13

## 2018-02-01 RX ADMIN — DIVALPROEX SODIUM 1000 MG: 500 TABLET, EXTENDED RELEASE ORAL at 08:13

## 2018-02-01 RX ADMIN — THERA TABS 1 TABLET: TAB at 08:13

## 2018-02-01 RX ADMIN — QUETIAPINE 25 MG: 25 TABLET ORAL at 08:13

## 2018-02-01 RX ADMIN — OMEGA-3-ACID ETHYL ESTERS 1 G: 1 CAPSULE, LIQUID FILLED ORAL at 08:13

## 2018-02-01 RX ADMIN — QUETIAPINE 25 MG: 25 TABLET ORAL at 13:58

## 2018-02-01 NOTE — PLAN OF CARE
Problem: General Plan of Care (Inpatient Behavioral)  Goal: Discharge Planning  Patient attended Process Group on 1/29, 1/30, 1/31 and 2/1/18. Participation complete and appropriate.

## 2018-02-01 NOTE — PROGRESS NOTES
[facility] Progress Note       Interim History     Family meeting held at 1400 with pt's two daughters and his wife. Reviewed the side effects, benefits, and complications of medication with pt's family. Pt's wife reports that pt had told his life story to another patient's father on the unit yesterday. 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 His daughter was concerned that pt has had depressive episodes in the past. Discussed starting pt on Latuda if depression becomes an issue in the future. His memory has been improving while on the medications and he has not experienced any panic attacks. Educated pt's family on the importance of medication compliance especially for an individual with bipolar disorder. Pt's wife has been watching him take his medications for the past four years, so she will continue this routine. Informed pt's family of plan for pt to attend IOP at [facility]. Recommended that pt only attend the [employer] writer breakfast and not to participate in any [employer] activities downtown as this can easily exacerbate his mental illness. Also recommended that pt is blocked from social media sites such as [employer] and [employer].     Pt later entered the room. Tolerating medications without side effects. He reports he is doing well. Denies suicidal or homicidal ideation. Pt is stabilizing on current medications. Reviewed what was discussed with pt's family. He will be attending IOP at [facility] for four to six weeks. He is amenable to participating in the program. He is agreeable to only attending the writer's breakfast and he does not wish to drive downtown for the [employer] festivities. He brought up a press conference that he would like to attend, but it was recommended that he does not attend this either. He was recommended not to sign in to  social media. Pt is cleared to discharge. 30 day supply of medication provided at time of discharge. Pt to follow-up with Dr. Mayo at [facility] within 1 week.       Hospital Course     On 1/21/18, pt was admitted for manic behavior and agitation. He was started on Depakote 500mg bid. On 1/22/18, pt continued to exhibit tequila and lability. He will be continued on Depakote and provided Zyprexa and Seroquel PRN. On 1/23/18, pt was starting to become less manic and grandiose. Will increase Depakote to 750mg bid and valproic acid draw STAT. Aricept discontinued. On 1/24/18, pt reported that he was frustrated he was unable to utilize his CPAP properly. He will be moved to SDU as he is more appropriate with the medications and able to participate in groups. On 1/25/18, pt continued to have hypomanic behavior. He was encouraged to practice proper boundaries. Depakote will be increased to 1000mg bid. Planned for family meeting on 1/29/18. On 1/26/18, pt was more able to recognize his disruptive behavior and is willing to practice better boundaries. He will continue on Depakote. On 1/29/18, pt continues to improve and stabilize while on the unit, he remains somewhat hyperverbal and euphoric. VA level was 92. On 1/30/18, staff continued to report that pt had issues with memory and attention, but pt reports it is improving. He had some issues sleeping at night. Seroquel 100mg qhs was initiated. Lamictal was discontinued. On 1/31/18, spoke with pt's wife to collect history and confirm family meeting for tomorrow. Pt reported feeling better and sleeping with Seroquel. Recommended that pt attend IOP at [facility] [place]. On 2/1/18, family meeting was held with pt's daughters and wife. He will discharge today. 30 day supply of medication provided at time of discharge. Denies suicidal or homicidal ideation. Pt to follow-up with Dr. Mayo at [facility] within 1 week.      Medications      Current Facility-Administered Medications Ordered in Epic   Medication Dose Route Frequency Last Rate Last Dose     QUEtiapine (SEROquel) tablet 100 mg  100 mg Oral At Bedtime   100 mg at 01/31/18 2022     divalproex (DEPAKOTE ER) 24 hr tablet 1,000 mg  1,000 mg Oral BID   1,000 mg at 02/01/18 0813     polyethylene glycol (MIRALAX/GLYCOLAX) Packet 17 g  17 g Oral Daily   17 g at 02/01/18 0813     hydrOXYzine (ATARAX) tablet 25-50 mg  25-50 mg Oral Q4H PRN   50 mg at 01/24/18 2324     aspirin chewable tablet 81 mg  81 mg Oral At Bedtime   81 mg at 01/31/18 2155     atorvastatin (LIPITOR) tablet 20 mg  20 mg Oral At Bedtime   20 mg at 01/31/18 2155     lisinopril (PRINIVIL/ZESTRIL) tablet 5 mg  5 mg Oral At Bedtime   5 mg at 01/31/18 2155     magnesium oxide (MAG-OX) half-tab 200 mg  200 mg Oral Daily   200 mg at 02/01/18 0813     metFORMIN (GLUCOPHAGE-XR) 24 hr tablet 500 mg  500 mg Oral Daily with supper   500 mg at 01/31/18 1739     multivitamin, therapeutic (THERA-VIT) tablet 1 tablet  1 tablet Oral Daily   1 tablet at 02/01/18 0813     omega-3 acid ethyl esters (Lovaza) capsule 1 g  1 g Oral Daily   1 g at 02/01/18 0813     QUEtiapine (SEROquel) tablet 25 mg  25 mg Oral BID   25 mg at 02/01/18 0813     QUEtiapine (SEROquel) tablet 25-50 mg  25-50 mg Oral BID PRN   50 mg at 01/25/18 1109     tamsulosin (FLOMAX) capsule 0.4 mg  0.4 mg Oral At Bedtime   0.4 mg at 01/31/18 2155     cholecalciferol (vitamin D3) tablet 1,000 Units  1,000 Units Oral At Bedtime   1,000 Units at 01/31/18 2155     OLANZapine (zyPREXA) tablet 5-10 mg  5-10 mg Oral Q6H PRN   10 mg at 01/24/18 2324     Current Outpatient Prescriptions Ordered in Epic   Medication     divalproex sodium extended-release (DEPAKOTE ER) 500 MG 24 hr tablet     QUEtiapine (SEROQUEL) 100 MG tablet     QUEtiapine (SEROQUEL) 25 MG tablet         Allergies      No Known Allergies     Medical Review of Systems     /78  Pulse 56  Temp 98.4  F (36.9  C) (Oral)  " Resp 15  Ht 1.753 m (5' 9\")  Wt 118.8 kg (261 lb 14.4 oz)  SpO2 97%  BMI 38.68 kg/m2  Body mass index is 38.68 kg/(m^2).  A 10-point review of systems was performed by Jairo Mayo MD and is negative, no new findings.      Psychiatric Examination     Appearance Sitting in chair, dressed in scrubs. Appears stated age.   Attitude Cooperative, less intrusive, pleasant   Orientation Oriented to person, place, time   Eye Contact Fair   Speech Much less copious   Language Normal   Psychomotor Behavior Normal   Mood Much less grandiose and euphoric   Affect Brighter   Thought Process Intact   Associations Intact   Thought Content Denies suicidal or homicidal ideation.    Fund of Knowledge Average   Insight Improving   Judgement Fair   Attention Span & Concentration Improving   Recent & Remote Memory Intact   Gait Normal    Muscle Tone Intact on visual inspection.        Labs     Labs reviewed.  No results found for this or any previous visit (from the past 24 hour(s)).     Impression      This is a 72 year old male with a history of bipolar disorder, depression, and cognitive disorder who presents with manic behavior and agitation. He was cleared for discharge and will attend IOP at [facility] [place].      Diagnoses      1. Bipolar Disorder Type I, most recent episode manic with psychotic features.      Plan      1. Explained side effects, benefits, and complications of medications to the patient, Pt gave verbal consent.  2. Medication changes:  Continue current medications.  3. Discussed treatment plan with patient and team.  4. Projected length of stay: Pt to discharge today.  5. 30 day supply of medication provided at time of discharge.  6. Pt to follow-up with Dr. Mayo at [facility] [place] within 1 week.    7. IOP at [facility] [place].      Attestation:   Patient has been seen and evaluated by me, Jairo Mayo MD.    Patient ID:  Name: Konstantin Morris   MRN: " [id number]  Admission: 1/20/2018   YOB: 1945

## 2018-02-01 NOTE — PLAN OF CARE
Problem: Manic Symptoms  Goal: Manic Symptoms  Signs and symptoms of listed problems will be absent or manageable.   Outcome: No Change  Patient was present on the unit, appeared calm in mood and was pleasant and cooperative. Patient appears confused, was observed walking into another patient's room but was easily redirected. Patient also needed frequent directions to finish his project in activity group. He attended both evening groups. He visited with his wife this evening and reported he had a good day today. Patient was observed taking a seat in front of the nursing station and did not have any interaction with any staff, a short time later, he came up to the nursing station and was upset and raised his voice stating that he had been waiting for his medications and no one gave them to him.

## 2018-02-01 NOTE — PROGRESS NOTES
Discharge teaching done with pt and pt's wife Hodan and two adult daughters.  Pt denied SI.  Pt verbalized understanding of medications and out pt f/u TX plan. Medications filled here, one RX for Seroquel is to soon to fill and was sent to Pt's out pt pharmacy, other med's that were filled here were sent home with pt. Belongings returned to pt at discharge. Pt discharged to home and left with his family.

## 2018-02-08 NOTE — DISCHARGE SUMMARY
[facility] Discharge Summary      DATE OF ADMISSION: 1/20/2018     DATE OF DISCHARGE: 2/1/2018    PRIMARY CARE PHYSICIAN: [facility], [place]    IDENTIFICATION: Patient is a 72 year old   male. Pt sees a therapist at [facility]. He sees Sonia Anaya CNP for medication management. For history, see dictation by Dr. Mayo on SDU. For physical summary, see dictation by Jake Combs MD on 1/21/18.     HOSPITAL COURSE: Mr. Konstantin Morris is a 72 year old male with a history of bipolar disorder and cognitive disorder. He states he has never exhibited manic symptoms prior to this recent episode. He states that he saw his medication provider recently and was given a diagnosis of bipolar disorder. He is a poor historian. 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 He claims he has won [employer] for the work he had done for television. He reports he has interviewed a savant in the past. He states that he has worked as a  as well. He is quite grandiose in his presentation. He reports that he has met many famous people throughout his life. Pt has had a manic episode with elevated and expansive mood, a surplus of energy, grandiosity, decreased need for sleep, pressured speech, flight of ideas, increase in goal-oriented activity, increase in psychomotor agitation all of which were to the point of psychosocial impairment. Pt endorses that episode was not secondary to chemical use and lasted at least one week in time.  When informed that MD had to leave to see other patients, he urged MD to stay and listen to history. He continued to ramble and eventually interview had to be terminated. He then shut down and became withdrawn.    On 1/21/18, pt was admitted for manic behavior and agitation. He was started on Depakote 500mg bid. On  1/22/18, pt continued to exhibit tequila and lability. He will be continued on Depakote and provided Zyprexa and Seroquel PRN. On 1/23/18, pt was starting to become less manic and grandiose. Will increase Depakote to 750mg bid and valproic acid draw STAT. Aricept discontinued. On 1/24/18, pt reported that he was frustrated he was unable to utilize his CPAP properly. He will be moved to SDU as he is more appropriate with the medications and able to participate in groups. On 1/25/18, pt continued to have hypomanic behavior. He was encouraged to practice proper boundaries. Depakote will be increased to 1000mg bid. Planned for family meeting on 1/29/18. On 1/26/18, pt was more able to recognize his disruptive behavior and is willing to practice better boundaries. He will continue on Depakote. On 1/29/18, pt continues to improve and stabilize while on the unit, he remains somewhat hyperverbal and euphoric. VA level was 92. On 1/30/18, staff continued to report that pt had issues with memory and attention, but pt reports it is improving. He had some issues sleeping at night. Seroquel 100mg qhs was initiated. Lamictal was discontinued. On 1/31/18, spoke with pt's wife to collect history and confirm family meeting for tomorrow. Pt reported feeling better and sleeping with Seroquel. Recommended that pt attend IOP at [facility] [place]. On 2/1/18, family meeting was held with pt's daughters and wife. He will discharge today. 30 day supply of medication provided at time of discharge. Denies suicidal or homicidal ideation. Pt to follow-up with Dr. Mayo at [facility] within 1 week.     DISCHARGE MENTAL STATUS EXAMINATION:    Appearance Sitting in chair, dressed in scrubs. Appears stated age.   Attitude Cooperative, less intrusive, pleasant   Orientation Oriented to person, place, time   Eye Contact Fair   Speech Much less copious   Language Normal   Psychomotor Behavior Normal   Mood Much less grandiose and  "euphoric   Affect Brighter   Thought Process Intact   Associations Intact   Thought Content Denies suicidal or homicidal ideation.    Fund of Knowledge Average   Insight Improving   Judgement Fair   Attention Span & Concentration Improving   Recent & Remote Memory Intact   Gait Normal    Muscle Tone Intact on visual inspection.         LABORATORY DATA:    Refer to hospitalist admission dictation.  No results found for this or any previous visit (from the past 24 hour(s)).     /78  Pulse 56  Temp 98.4  F (36.9  C) (Oral)  Resp 15  Ht 1.753 m (5' 9\")  Wt 118.8 kg (261 lb 14.4 oz)  SpO2 97%  BMI 38.68 kg/m2     DISCHARGE MEDICATIONS:      Review of your medicines      START taking       Dose / Directions    divalproex sodium extended-release 500 MG 24 hr tablet   Commonly known as:  DEPAKOTE ER   Used for:  Severe manic bipolar I disorder with psychotic features (H)        Dose:  1000 mg   Take 2 tablets (1,000 mg) by mouth 2 times daily   Quantity:  120 tablet   Refills:  0         CONTINUE these medicines which may have CHANGED, or have new prescriptions. If we are uncertain of the size of tablets/capsules you have at home, strength may be listed as something that might have changed.       Dose / Directions    * QUEtiapine 100 MG tablet   Commonly known as:  SEROquel   This may have changed:    - medication strength  - how much to take  - Another medication with the same name was removed. Continue taking this medication, and follow the directions you see here.   Used for:  Severe manic bipolar I disorder with psychotic features (H)        Dose:  100 mg   Take 1 tablet (100 mg) by mouth At Bedtime   Quantity:  30 tablet   Refills:  0       * QUEtiapine 25 MG tablet   Commonly known as:  SEROquel   This may have changed:    - Another medication with the same name was changed. Make sure you understand how and when to take each.  - Another medication with the same name was removed. Continue taking this " medication, and follow the directions you see here.   Used for:  Severe manic bipolar I disorder with psychotic features (H)        Dose:  25 mg   Take 1 tablet (25 mg) by mouth 2 times daily   Quantity:  60 tablet   Refills:  0       * Notice:  This list has 2 medication(s) that are the same as other medications prescribed for you. Read the directions carefully, and ask your doctor or other care provider to review them with you.      CONTINUE these medicines which have NOT CHANGED       Dose / Directions    ASPIRIN PO        Dose:  81 mg   Take 81 mg by mouth At Bedtime   Refills:  0       FLOMAX 0.4 MG capsule   Generic drug:  tamsulosin        Dose:  0.4 mg   Take 0.4 mg by mouth At Bedtime   Refills:  0       LIPITOR PO        Dose:  20 mg   Take 20 mg by mouth At Bedtime   Refills:  0       LISINOPRIL PO        Dose:  5 mg   Take 5 mg by mouth At Bedtime   Refills:  0       MAGNESIUM OXIDE PO        Dose:  250 mg   Take 250 mg by mouth daily   Refills:  0       metFORMIN 500 MG 24 hr tablet   Commonly known as:  GLUCOPHAGE-XR        Dose:  500 mg   Take 500 mg by mouth daily (with dinner)   Refills:  0       multivitamin, therapeutic Tabs tablet        Dose:  1 tablet   Take 1 tablet by mouth daily   Refills:  0       omega-3 acid ethyl esters 1 G capsule   Commonly known as:  Lovaza        Dose:  1.4 g   Take 1.4 g by mouth daily   Refills:  0       VITAMIN D (CHOLECALCIFEROL) PO        Dose:  1000 Units   Take 1,000 Units by mouth At Bedtime   Refills:  0         STOP taking          DONEPEZIL HCL PO           LAMICTAL PO           ZOLOFT PO                Where to get your medicines      These medications were sent to [facility] - [address]  [address] [place] [address] [address]     Phone:  [phone]      divalproex sodium extended-release 500 MG 24 hr tablet     QUEtiapine 100 MG tablet     QUEtiapine 25 MG tablet             DISCHARGE DIAGNOSES:    1. Bipolar  Disorder Type I, most recent episode manic with psychotic features.    DISCHARGE PLAN:     1. Explained side effects, benefits, and complications of medications to the patient, Pt gave verbal consent.  2. Medication changes:  Continue current medications.  3. Discussed treatment plan with patient and team.  4. Projected length of stay: Pt to discharge today.  5. 30 day supply of medication provided at time of discharge.  6. Pt to follow-up with Dr. Mayo at [facility] within 1 week.    7. IOP at [facility] [place].    DISCHARGE FOLLOW-UP:    Psychiatry Follow-up:      [facility]  [address] [place] [address] , [id number]  NASIMA [place]  [phone]  Fax [phone]  Intake appointment for Intensive Outpatient Program-[facility] [place] with _Chriss Boo on Tuesday 2/6/18 @ 8:30 paperwork and interview @ 9 am._     [facility]  [address] [place] [address] , [address]  [place]A [place]  [phone]  Fax [phone]  Appointment with Dr. Jairo Mayo MD on Friday, February 9 at 3:15 PM    Attestation:   Patient has been seen and evaluated by me, Jairo Mayo MD.    Patient ID:    Name: Konstantin Morris   MRN: [id number]  Admission: 1/20/2018   YOB: 1945

## 2023-01-01 ENCOUNTER — APPOINTMENT (OUTPATIENT)
Dept: CT IMAGING | Facility: CLINIC | Age: 78
End: 2023-01-01
Attending: EMERGENCY MEDICINE
Payer: COMMERCIAL

## 2023-01-01 ENCOUNTER — HOSPITAL ENCOUNTER (EMERGENCY)
Facility: CLINIC | Age: 78
Discharge: HOME | End: 2023-01-07
Attending: EMERGENCY MEDICINE | Admitting: EMERGENCY MEDICINE
Payer: COMMERCIAL

## 2023-01-01 VITALS
TEMPERATURE: 97.8 F | DIASTOLIC BLOOD PRESSURE: 78 MMHG | HEART RATE: 88 BPM | RESPIRATION RATE: 18 BRPM | SYSTOLIC BLOOD PRESSURE: 112 MMHG | OXYGEN SATURATION: 98 %

## 2023-01-01 DIAGNOSIS — C79.9 METASTATIC MALIGNANT NEOPLASM, UNSPECIFIED SITE (H): ICD-10-CM

## 2023-01-01 DIAGNOSIS — R10.31 RLQ ABDOMINAL PAIN: ICD-10-CM

## 2023-01-01 LAB
ALBUMIN SERPL BCG-MCNC: 3.6 G/DL (ref 3.5–5.2)
ALBUMIN UR-MCNC: 20 MG/DL
ALP SERPL-CCNC: 390 U/L (ref 40–129)
ALT SERPL W P-5'-P-CCNC: 53 U/L (ref 10–50)
ANION GAP SERPL CALCULATED.3IONS-SCNC: 16 MMOL/L (ref 7–15)
APPEARANCE UR: CLEAR
AST SERPL W P-5'-P-CCNC: 79 U/L (ref 10–50)
BACTERIA BLD CULT: NO GROWTH
BASOPHILS # BLD AUTO: 0 10E3/UL (ref 0–0.2)
BASOPHILS NFR BLD AUTO: 0 %
BILIRUB SERPL-MCNC: 0.8 MG/DL
BILIRUB UR QL STRIP: NEGATIVE
BUN SERPL-MCNC: 15.8 MG/DL (ref 8–23)
CALCIUM SERPL-MCNC: 8.6 MG/DL (ref 8.8–10.2)
CHLORIDE SERPL-SCNC: 100 MMOL/L (ref 98–107)
COLOR UR AUTO: YELLOW
CREAT SERPL-MCNC: 0.87 MG/DL (ref 0.67–1.17)
DEPRECATED HCO3 PLAS-SCNC: 22 MMOL/L (ref 22–29)
EOSINOPHIL # BLD AUTO: 0 10E3/UL (ref 0–0.7)
EOSINOPHIL NFR BLD AUTO: 0 %
ERYTHROCYTE [DISTWIDTH] IN BLOOD BY AUTOMATED COUNT: 16.4 % (ref 10–15)
GFR SERPL CREATININE-BSD FRML MDRD: 89 ML/MIN/1.73M2
GLUCOSE SERPL-MCNC: 136 MG/DL (ref 70–99)
GLUCOSE UR STRIP-MCNC: NEGATIVE MG/DL
HCT VFR BLD AUTO: 53.8 % (ref 40–53)
HGB BLD-MCNC: 16.8 G/DL (ref 13.3–17.7)
HGB UR QL STRIP: NEGATIVE
HOLD SPECIMEN: NORMAL
IMM GRANULOCYTES # BLD: 0.1 10E3/UL
IMM GRANULOCYTES NFR BLD: 1 %
KETONES UR STRIP-MCNC: 20 MG/DL
LACTATE SERPL-SCNC: 1 MMOL/L (ref 0.7–2)
LEUKOCYTE ESTERASE UR QL STRIP: NEGATIVE
LIPASE SERPL-CCNC: 20 U/L (ref 13–60)
LYMPHOCYTES # BLD AUTO: 1.3 10E3/UL (ref 0.8–5.3)
LYMPHOCYTES NFR BLD AUTO: 9 %
MCH RBC QN AUTO: 25.8 PG (ref 26.5–33)
MCHC RBC AUTO-ENTMCNC: 31.2 G/DL (ref 31.5–36.5)
MCV RBC AUTO: 83 FL (ref 78–100)
MONOCYTES # BLD AUTO: 0.8 10E3/UL (ref 0–1.3)
MONOCYTES NFR BLD AUTO: 6 %
MUCOUS THREADS #/AREA URNS LPF: PRESENT /LPF
NEUTROPHILS # BLD AUTO: 12.1 10E3/UL (ref 1.6–8.3)
NEUTROPHILS NFR BLD AUTO: 84 %
NITRATE UR QL: NEGATIVE
NRBC # BLD AUTO: 0 10E3/UL
NRBC BLD AUTO-RTO: 0 /100
PH UR STRIP: 5.5 [PH] (ref 5–7)
PLATELET # BLD AUTO: 182 10E3/UL (ref 150–450)
POTASSIUM SERPL-SCNC: 4.4 MMOL/L (ref 3.4–5.3)
PROT SERPL-MCNC: 7.3 G/DL (ref 6.4–8.3)
RBC # BLD AUTO: 6.5 10E6/UL (ref 4.4–5.9)
RBC URINE: 3 /HPF
SODIUM SERPL-SCNC: 138 MMOL/L (ref 136–145)
SP GR UR STRIP: 1.01 (ref 1–1.03)
UROBILINOGEN UR STRIP-MCNC: NORMAL MG/DL
WBC # BLD AUTO: 14.3 10E3/UL (ref 4–11)
WBC URINE: 0 /HPF

## 2023-01-01 PROCEDURE — 250N000011 HC RX IP 250 OP 636: Performed by: EMERGENCY MEDICINE

## 2023-01-01 PROCEDURE — 83690 ASSAY OF LIPASE: CPT | Performed by: EMERGENCY MEDICINE

## 2023-01-01 PROCEDURE — 99285 EMERGENCY DEPT VISIT HI MDM: CPT | Mod: 25

## 2023-01-01 PROCEDURE — 96361 HYDRATE IV INFUSION ADD-ON: CPT

## 2023-01-01 PROCEDURE — 96374 THER/PROPH/DIAG INJ IV PUSH: CPT | Mod: 59

## 2023-01-01 PROCEDURE — 96360 HYDRATION IV INFUSION INIT: CPT | Mod: 59

## 2023-01-01 PROCEDURE — 74177 CT ABD & PELVIS W/CONTRAST: CPT | Mod: MG

## 2023-01-01 PROCEDURE — 87040 BLOOD CULTURE FOR BACTERIA: CPT | Performed by: EMERGENCY MEDICINE

## 2023-01-01 PROCEDURE — 85025 COMPLETE CBC W/AUTO DIFF WBC: CPT | Performed by: EMERGENCY MEDICINE

## 2023-01-01 PROCEDURE — 36415 COLL VENOUS BLD VENIPUNCTURE: CPT | Performed by: EMERGENCY MEDICINE

## 2023-01-01 PROCEDURE — 80053 COMPREHEN METABOLIC PANEL: CPT | Performed by: EMERGENCY MEDICINE

## 2023-01-01 PROCEDURE — 258N000003 HC RX IP 258 OP 636: Performed by: EMERGENCY MEDICINE

## 2023-01-01 PROCEDURE — 81001 URINALYSIS AUTO W/SCOPE: CPT | Performed by: EMERGENCY MEDICINE

## 2023-01-01 PROCEDURE — 83605 ASSAY OF LACTIC ACID: CPT | Performed by: EMERGENCY MEDICINE

## 2023-01-01 PROCEDURE — 250N000013 HC RX MED GY IP 250 OP 250 PS 637: Performed by: EMERGENCY MEDICINE

## 2023-01-01 PROCEDURE — 96375 TX/PRO/DX INJ NEW DRUG ADDON: CPT

## 2023-01-01 RX ORDER — IOPAMIDOL 755 MG/ML
500 INJECTION, SOLUTION INTRAVASCULAR ONCE
Status: COMPLETED | OUTPATIENT
Start: 2023-01-01 | End: 2023-01-01

## 2023-01-01 RX ORDER — ONDANSETRON 2 MG/ML
4 INJECTION INTRAMUSCULAR; INTRAVENOUS ONCE
Status: COMPLETED | OUTPATIENT
Start: 2023-01-01 | End: 2023-01-01

## 2023-01-01 RX ORDER — MORPHINE SULFATE 2 MG/ML
2 INJECTION, SOLUTION INTRAMUSCULAR; INTRAVENOUS ONCE
Status: COMPLETED | OUTPATIENT
Start: 2023-01-01 | End: 2023-01-01

## 2023-01-01 RX ORDER — OXYCODONE HYDROCHLORIDE 5 MG/1
5 TABLET ORAL EVERY 6 HOURS PRN
Qty: 10 TABLET | Refills: 0 | Status: SHIPPED | OUTPATIENT
Start: 2023-01-01 | End: 2023-01-01

## 2023-01-01 RX ORDER — OXYCODONE HYDROCHLORIDE 5 MG/1
5 TABLET ORAL ONCE
Status: COMPLETED | OUTPATIENT
Start: 2023-01-01 | End: 2023-01-01

## 2023-01-01 RX ORDER — ONDANSETRON 4 MG/1
4 TABLET, ORALLY DISINTEGRATING ORAL EVERY 8 HOURS PRN
Qty: 10 TABLET | Refills: 0 | Status: SHIPPED | OUTPATIENT
Start: 2023-01-01 | End: 2023-01-01

## 2023-01-01 RX ADMIN — OXYCODONE HYDROCHLORIDE 5 MG: 5 TABLET ORAL at 05:24

## 2023-01-01 RX ADMIN — SODIUM CHLORIDE, POTASSIUM CHLORIDE, SODIUM LACTATE AND CALCIUM CHLORIDE 1000 ML: 600; 310; 30; 20 INJECTION, SOLUTION INTRAVENOUS at 00:56

## 2023-01-01 RX ADMIN — MORPHINE SULFATE 2 MG: 2 INJECTION, SOLUTION INTRAMUSCULAR; INTRAVENOUS at 00:56

## 2023-01-01 RX ADMIN — IOPAMIDOL 100 ML: 755 INJECTION, SOLUTION INTRAVENOUS at 01:54

## 2023-01-01 RX ADMIN — SODIUM CHLORIDE 1000 ML: 9 INJECTION, SOLUTION INTRAVENOUS at 03:30

## 2023-01-01 RX ADMIN — ONDANSETRON HYDROCHLORIDE 4 MG: 2 INJECTION, SOLUTION INTRAMUSCULAR; INTRAVENOUS at 00:55

## 2023-01-01 ASSESSMENT — ENCOUNTER SYMPTOMS
ABDOMINAL PAIN: 1
DIFFICULTY URINATING: 1
APPETITE CHANGE: 1
BACK PAIN: 1

## 2023-01-01 ASSESSMENT — ACTIVITIES OF DAILY LIVING (ADL)
ADLS_ACUITY_SCORE: 35
ADLS_ACUITY_SCORE: 37
ADLS_ACUITY_SCORE: 35
ADLS_ACUITY_SCORE: 37

## 2023-01-06 NOTE — ED TRIAGE NOTES
Presents to ED with c/o low back pain x 2 days and dysuria. Patient has advanced dementia. Wife stated over the past 2 days he has had back pain when getting in and out of bed. She also stated she thinks he has had some increased difficulty with urination.      Triage Assessment     Row Name 01/06/23 5740       Triage Assessment (Adult)    Airway WDL WDL       Respiratory WDL    Respiratory WDL WDL       Cardiac WDL    Cardiac WDL WDL

## 2023-01-07 NOTE — ED NOTES
Daughter asked nurse to assess patient due to him having a panic attack. This writer assessed the patient. RN gave warm packs to patient for back pain due to patient stating his back hurts and resituated patient to another position. Patient and family appear more comfortable.

## 2023-01-07 NOTE — ED PROVIDER NOTES
History     Chief Complaint:  Back Pain and Dysuria     HPI   Konstantin Morris is a 77 year old male with a history dementia, diabetes, hypertension who presents with back pain and dysuria. The patient was not able to provide any medical information, thus his wife spoke on his behalf. She states that the patient has been having right lower stomach and back pain for a couple of days. The wife states that the patient has trouble urinating, consistently is dehydrated, along with [id number] appetite.     The wife notes that the patient had a MRI in December of 2021 for an evaluation of PSA. They found out the patient had polyps in his rectum, having him have an endoscopy. In October of 2022, she reports that he was supposed to have a CT scan in October due to significant weight loss. In December, the patients gallbladder had become inflamed, but did not have it removed.    Independent Historian: Patient's wife    Review of External Notes: outpatient clinic notes     ROS:  Review of Systems   Constitutional: Positive for appetite change (Reduced).   Gastrointestinal: Positive for abdominal pain (Right lower stomach).   Genitourinary: Positive for difficulty urinating.   Musculoskeletal: Positive for back pain (Right).     Allergies:  Donepezil     Medications:    Aspirin PO  Flonase  Lipitor PO  Depakote ER  Lisinopril PO  Magnesium oxide PO  Glucophage-XR  Thera-Vit  Lovaza  Seroquel  Flomax  Seroquel    Past Medical History:    Depression  Hypertension  Diabetes (Type II)   Neuropathy  Obstructive sleep apnea  Insomnia  Panic disorder  Subclinical hypothyroidism  Hyperlipidemia  Cervical spinal stenosis  Obesity  Anxiety   Pes cavus  Peripheral neuropathy  Thrombocytopenia  Erectile dysfunction  Subungual hematoma    Past Surgical History:  [facility] electroconvulsive therapy       Family History:    Father: Abdominal aortic aneurysm  Mother: Bipolar disorder  Sister(s): Bipolar disorder, Dementia, Heart disease, GI  bleeding    Social History:  Patient arrived via private vehicle with wife to the ED.   PCP: [facility], [place]     Physical Exam     Patient Vitals for the past 24 hrs:   BP Temp Temp src Pulse Resp SpO2   01/06/23 2235 108/82 97.4  F (36.3  C) Temporal 112 18 98 %   01/06/23 1753 99/78 97.7  F (36.5  C) Temporal 102 18 98 %        Physical Exam  Constitutional: Alert, attentive, confused to recent events (baseline)  HENT:    Nose: Nose normal.    Mouth/Throat: Oropharynx is clear, mucous membranes are moist   Eyes: EOM are normal.   CV: regular rate and rhythm; no murmurs, rubs or gallups  Chest: Effort normal and breath sounds normal.   GI:  There is no tenderness. No distension. Normal bowel sounds  MSK: Normal range of motion.   Neurological: Alert, attentive  Skin: Skin is warm and dry.      Emergency Department Course     Imaging:  CT Abdomen Pelvis w Contrast   Final Result   Addendum (preliminary) 1 of 1   Findings were discussed with dr dontrell pino at 2:34 AM on 01/07/2023.      Final   IMPRESSION:    1.  Findings consistent with metastatic rectal cancer, with innumerable hepatic metastatic lesions, perirectal adenopathy and perirectal implants involving the mesorectal fascia.   2.  There is also a 1 cm circular lesion in the cecum is also amendable to direct visualization with colonoscopy.   3.  Multiple pancreatic cysts measuring up to 1 cm. See below for follow-up recommendations.   4.  Mildly dense bilateral renal hypodense lesions could reflect cyst containing internal hemorrhage. Recommend follow-up with ultrasound.   5.  Urinary bladder thickening. Consider urinalysis.   6.  Indeterminate prominent retroperitoneal lymph nodes measuring up to 0.8 cm.   7.  Indeterminate 0.6 cm sclerotic lesion in the right ischium.   8.  The visualized spine and sacrum appear slightly sclerosed. Underlying bone marrow metastasis cannot be excluded. Further evaluation with MRI is recommended.   9.  Linear  lucency traversing through the right anterior acetabular wall. Correlation for hip pain to exclude fracture.      REFERENCE:   Revisions of international consensus [place] guidelines for the management of IPMN of the pancreas. Pancreatology 2017;17(5):738-753.      Largest cyst between 10-20 mm: CT or MRI/MRCP every 6 months for 1 year and then yearly for 2 years and then every 2 years if no change.             Report per radiology    Laboratory:  Labs Ordered and Resulted from Time of ED Arrival to Time of ED Departure   ROUTINE UA WITH MICROSCOPIC REFLEX TO CULTURE - Abnormal       Result Value    Color Urine Yellow      Appearance Urine Clear      Glucose Urine Negative      Bilirubin Urine Negative      Ketones Urine 20 (*)     Specific Gravity Urine 1.010      Blood Urine Negative      pH Urine 5.5      Protein Albumin Urine 20 (*)     Urobilinogen Urine Normal      Nitrite Urine Negative      Leukocyte Esterase Urine Negative      Mucus Urine Present (*)     RBC Urine 3 (*)     WBC Urine 0     COMPREHENSIVE METABOLIC PANEL - Abnormal    Sodium 138      Potassium 4.4      Chloride 100      Carbon Dioxide (CO2) 22      Anion Gap 16 (*)     Urea Nitrogen 15.8      Creatinine 0.87      Calcium 8.6 (*)     Glucose 136 (*)     Alkaline Phosphatase 390 (*)     AST 79 (*)     ALT 53 (*)     Protein Total 7.3      Albumin 3.6      Bilirubin Total 0.8      GFR Estimate 89     CBC WITH PLATELETS AND DIFFERENTIAL - Abnormal    WBC Count 14.3 (*)     RBC Count 6.50 (*)     Hemoglobin 16.8      Hematocrit 53.8 (*)     MCV 83      MCH 25.8 (*)     MCHC 31.2 (*)     RDW 16.4 (*)     Platelet Count 182      % Neutrophils 84      % Lymphocytes 9      % Monocytes 6      % Eosinophils 0      % Basophils 0      % Immature Granulocytes 1      NRBCs per 100 WBC 0      Absolute Neutrophils 12.1 (*)     Absolute Lymphocytes 1.3      Absolute Monocytes 0.8      Absolute Eosinophils 0.0      Absolute Basophils 0.0      Absolute Immature  Granulocytes 0.1      Absolute NRBCs 0.0     LIPASE - Normal    Lipase 20     LACTIC ACID WHOLE BLOOD - Normal    Lactic Acid 1.0     BLOOD CULTURE   BLOOD CULTURE     Emergency Department Course & Assessments:     Interventions:  Medications   lactated ringers BOLUS 1,000 mL (0 mLs Intravenous Stopped 1/7/23 0311)   ondansetron (ZOFRAN) injection 4 mg (4 mg Intravenous Given 1/7/23 0055)   morphine (PF) injection 2 mg (2 mg Intravenous Given 1/7/23 0056)   sodium chloride (PF) 0.9% PF flush 100 mL (65 mLs Intravenous Given 1/7/23 0154)   iopamidol (ISOVUE-370) solution 500 mL (100 mLs Intravenous Given 1/7/23 0154)   0.9% sodium chloride BOLUS (0 mLs Intravenous Stopped 1/7/23 0520)   oxyCODONE (ROXICODONE) tablet 5 mg (5 mg Oral Given 1/7/23 0524)      Patient is ambulatory with a walker in the ED, similar to baseline.      Disposition:  The patient was discharged to home.     Impression & Plan    Medical Decision Making:  This is a 77-year-old male who presents for evaluation of back pain and right lower quadrant abdominal pain in the context of longstanding dementia as well as unintentional weight loss.  Family had intended to undergo CT abdomen pelvis and further possible cancer screening evaluation in October for the same reason but did not follow-up.  Work-up today shows nonspecific leukocytosis, reassuring metabolic panel, but highly concerning CT abdomen pelvis suggesting widely metastatic rectal cancer.  Patient does not have right hip pain and is ambulatory with a walker, so doubt acetabular fracture.  However, findings do appear consistent with likely bony mets to the spine and ischium accounting for the patient's pain today.  I had an extended shared his main conversation with the patient, his wife, and family friend at bedside.  They strongly prefer to be discharged at this time.  Discussed this upcoming oncology visit that I have arranged for via voicemail rapid follow-up process could be focusing on  palliative only given the above.  Plan prescribed oxycodone and Zofran for any symptom control and return for weakness, intractable pain, vomiting, or any other concerns.        Diagnosis:    ICD-10-CM    1. Metastatic malignant neoplasm, unspecified site (H)  C79.9       2. RLQ abdominal pain  R10.31            Discharge Medications:  New Prescriptions    ONDANSETRON (ZOFRAN ODT) 4 MG ODT TAB    Take 1 tablet (4 mg) by mouth every 8 hours as needed for nausea    OXYCODONE (ROXICODONE) 5 MG TABLET    Take 1 tablet (5 mg) by mouth every 6 hours as needed for severe pain (7-10)        Scribe Disclosure:  CARLOS MARTINEZ, am serving as a scribe at 12:23 AM on 1/7/2023 to document services personally performed by Konstantin Thornton MD based on my observations and the provider's statements to me.        Konstantin Thornton MD  01/07/23 0548

## 2023-01-07 NOTE — ED NOTES
Rapid Assessment Note    History:   Konstantin Morris is a 77 year old male who presents with lower back pain and lower abdominal pain for the past few days.  He is not been eating and drinking much and has had decreased urination.  He is demented and unable to give additional history.  No reported fevers, vomiting, cough, shortness of breath.  He has had some diarrhea.  No known injuries.    Exam:   Physical Exam  Vitals and nursing note reviewed.   Constitutional:       General: He is not in acute distress.     Appearance: Normal appearance. He is not ill-appearing.   HENT:      Head: Normocephalic and atraumatic.      Right Ear: External ear normal.      Left Ear: External ear normal.   Eyes:      Extraocular Movements: Extraocular movements intact.      Conjunctiva/sclera: Conjunctivae normal.   Pulmonary:      Effort: Pulmonary effort is normal. No respiratory distress.   Abdominal:      General: Abdomen is flat.      Palpations: Abdomen is soft.      Tenderness: There is abdominal tenderness in the right lower quadrant. There is guarding. There is no rebound.   Musculoskeletal:         General: No deformity or signs of injury.      Cervical back: Normal range of motion and neck supple.   Skin:     General: Skin is warm and dry.      Findings: No rash.   Neurological:      Mental Status: He is alert.           Plan of Care:   I evaluated the patient and developed an initial plan of care. I discussed this plan and explained that I, or one of my partners, would be returning to complete the evaluation.     Lower abdominal and lower back pain with decreased urination.  Right lower quadrant tenderness on exam.  He is also tachycardic and borderline hypotensive.  We will check septic work-up and CT of the abdomen pelvis.    01/06/2023  [facility]         Lorne Hsu MD  01/06/23 2493

## 2023-01-07 NOTE — ED NOTES
"Family was speaking to registration when writer walked up. Son was upset and stated that we were refusing to reassess patient and that he wanted this documented. Writer stated that we will reassess patient and that this is the first time writer has heard that they wanted him reassessed. Son continues to be upset and stated that patient has been in ED for over 5 hours and that he is uncomfortable sitting in the chair and that he is worried that his gallbladder is going to rupture d/t hx of stones. Writer apologized for long wait times and stated unfortunately we have people who have been waiting over 7 hours at this time, but that I am happy to take patient to be reassessed. Writer also informed son that when patient was initially triaged, patient's wife did not discuss hx of gallstones. Patient brought to triage room to be reassessed. VSS and charted in flow sheet. Writer informed that patient has hx of gall stones from \"years ago\" and he had an \"attack\" in December. Patient now c/o lower abdominal pain. Son stated that primary MD wanted patient seen in ED about 2 weeks ago, but patient would not come into hospital at that time. Today patient agreed to be seen d/t increased pain. Patient transferred from triage room to intake room.    "